# Patient Record
Sex: FEMALE | Race: WHITE | NOT HISPANIC OR LATINO | URBAN - METROPOLITAN AREA
[De-identification: names, ages, dates, MRNs, and addresses within clinical notes are randomized per-mention and may not be internally consistent; named-entity substitution may affect disease eponyms.]

---

## 2021-09-02 ENCOUNTER — COMPLETE EYE EXAM (OUTPATIENT)
Dept: URBAN - METROPOLITAN AREA CLINIC 6 | Facility: CLINIC | Age: 66
End: 2021-09-02

## 2021-09-02 DIAGNOSIS — H25.13: ICD-10-CM

## 2021-09-02 DIAGNOSIS — H52.13: ICD-10-CM

## 2021-09-02 PROCEDURE — 92015 DETERMINE REFRACTIVE STATE: CPT

## 2021-09-02 PROCEDURE — 92014 COMPRE OPH EXAM EST PT 1/>: CPT

## 2021-09-02 ASSESSMENT — VISUAL ACUITY
OD_CC: 20/20
OS_CC: 20/25

## 2021-09-02 ASSESSMENT — TONOMETRY
OD_IOP_MMHG: 12
OS_IOP_MMHG: 14

## 2021-10-11 ENCOUNTER — GLASSES CHECK (OUTPATIENT)
Dept: URBAN - METROPOLITAN AREA CLINIC 6 | Facility: CLINIC | Age: 66
End: 2021-10-11

## 2021-10-11 DIAGNOSIS — H52.203: ICD-10-CM

## 2021-10-11 DIAGNOSIS — H52.13: ICD-10-CM

## 2021-10-11 DIAGNOSIS — H52.4: ICD-10-CM

## 2021-10-11 PROCEDURE — G8428 CUR MEDS NOT DOCUMENT: HCPCS

## 2021-10-11 PROCEDURE — 1036F TOBACCO NON-USER: CPT

## 2021-10-11 PROCEDURE — 92012 INTRM OPH EXAM EST PATIENT: CPT | Mod: NC

## 2021-10-11 ASSESSMENT — VISUAL ACUITY
OD_CC: J1
OD_CC: 20/30-1
OS_CC: J1
OS_CC: 20/30+2

## 2023-06-01 ENCOUNTER — OFFICE VISIT (OUTPATIENT)
Dept: OBGYN CLINIC | Facility: MEDICAL CENTER | Age: 68
End: 2023-06-01

## 2023-06-01 ENCOUNTER — APPOINTMENT (OUTPATIENT)
Dept: RADIOLOGY | Facility: MEDICAL CENTER | Age: 68
End: 2023-06-01
Payer: MEDICARE

## 2023-06-01 VITALS
DIASTOLIC BLOOD PRESSURE: 77 MMHG | HEART RATE: 60 BPM | WEIGHT: 139 LBS | BODY MASS INDEX: 23.16 KG/M2 | HEIGHT: 65 IN | SYSTOLIC BLOOD PRESSURE: 149 MMHG

## 2023-06-01 DIAGNOSIS — D48.0 NEOPLASM OF UNCERTAIN BEHAVIOR OF BONE AND ARTICULAR CARTILAGE: Primary | ICD-10-CM

## 2023-06-01 DIAGNOSIS — S86.891S MEDIAL TIBIAL STRESS SYNDROME, RIGHT, SEQUELA: ICD-10-CM

## 2023-06-01 DIAGNOSIS — D48.0 NEOPLASM OF UNCERTAIN BEHAVIOR OF BONE AND ARTICULAR CARTILAGE: ICD-10-CM

## 2023-06-01 PROCEDURE — 73590 X-RAY EXAM OF LOWER LEG: CPT

## 2023-06-01 RX ORDER — OMEPRAZOLE 20 MG/1
TABLET, DELAYED RELEASE ORAL
COMMUNITY

## 2023-06-01 RX ORDER — PRAVASTATIN SODIUM 20 MG
20 TABLET ORAL DAILY
COMMUNITY
Start: 2023-04-26

## 2023-06-01 RX ORDER — HYDROCHLOROTHIAZIDE 25 MG/1
TABLET ORAL
COMMUNITY
Start: 2023-03-29

## 2023-06-01 RX ORDER — CALCIUM CARBONATE 500 MG/1
TABLET, CHEWABLE ORAL
COMMUNITY

## 2023-06-01 RX ORDER — LORATADINE 10 MG/1
TABLET ORAL
COMMUNITY

## 2023-06-01 RX ORDER — PSEUDOEPHEDRINE HCL 30 MG
250 TABLET ORAL
COMMUNITY

## 2023-06-01 RX ORDER — SERTRALINE HYDROCHLORIDE 25 MG/1
25 TABLET, FILM COATED ORAL DAILY
COMMUNITY
Start: 2023-05-27

## 2023-06-01 NOTE — PROGRESS NOTES
Orthopedic Surgery Office Note  Malcolm Washington (31 y o  female)  : 1955 Encounter Date: 2023  Dr Caleb Loernzana DO, Orthopedic Surgeon  Orthopedic Oncology & [facility] Surgery   Phone:[phone] HSW:[phone]    Assessment and Plan: Malcolm Washington is a 76 y o  female with:     1  Stress reaction of right tibia, 3 weeks sp right bone biopsy  - based on history of stress fractures and MRI prior to bone biopsy shows periosteal inflammation  - marrow edema at the site of the periostitis can be seen as well  - discussed propensity to have stress reaction and stress fractures with this history and current findings  - discussed further imaging would be less helpful at this point due to inflammation from surgery  - current treatment following plan of stress reaction in bone/shin splints; to wear CAM boot and reduce levels of activity, especially high impact, and increase amount of rest    - discussed soft tissue and bony healing at 6 week anna, and still in acute healing period, to expect overlap of pain and surgical discomfort   - cannot take antiinflammatories due to history of ulcer dx on EGD  - osteopenia and relationship with PPI   - Activity: ADLs with CAM boot/supportive sneaker, restrict LE impact activities   - after 6-8 week post op period, return to stress reaction management   - voltaren gel written; instructed to avoid surgical wound      2  Osteoporosis   - discussed continued management with Vit D and calcium  - discussed further management regarding bisphosphonate/prolia with primary care to reduce risk of fracture if indicated at this time    3   Comorbidity, including: GERD, ASHLEY, hypothyroid, osteopenia, HLD  - continue current management   - encouraged follow up with GI to determine when appropriate to return to antiinflammatories     Procedure:  No procedures performed    Surgical Planning:   No surgery planned at this time    Follow up: Return in about 3 months (around "9/1/2023), or if symptoms worsen or fail to improve  Problem List Items Addressed This Visit        Other    Medial tibial stress syndrome, right, sequela   Other Visit Diagnoses     Neoplasm of uncertain behavior of bone and articular cartilage    -  Primary    Relevant Medications    Diclofenac Sodium (VOLTAREN) 1 %    Other Relevant Orders    XR tibia fibula 2 vw right        ___________________________________________________________________    History of Present Illness:     Lorri Corbin is a 76 y o  female  who presents for consultation at the request of Erna Mejía MD  regarding follow-up status post bone biopsy due to abnormal MRI findings so she can be closer managed to  February pain beginning, thought to be stress fracture  DOS: May 8th bone biopsy  Surgical discomfort plus prior pain sticking around  Proximal pain unchanged    She has reduced her activity with improvement in symptoms  Activity reduced due to pain  Pain and symptoms described when standing/walking/moving  Resolves with sitting and elevation  Pain described as sharp occasionally upon waking recently since surgery  Otherwise aching  Activity: likes to take walks, go to the gym, ride bikes, swimming  Wishes to return to tennis  Confirmed with MRI   2018 stress fracture in distal tib/fib; resolved with 6 months   2015 stress fracture in tibia     At baseline patient gaits without assistance  Denies constitutional symptoms such as fever, chills, night sweats, fatigue, weight gains/losses  Denies  chest pain/shortness of breath  Review of Systems:   Allergies, medications, past medical/surgical/family/social history have been reviewed  Complete 12 system review performed and found to be negative except: except as per mentioned in HPI      Physical Examination:   Height: 5' 5\" (165 1 cm)  Weight - Scale: 63 kg (139 lb)  BMI (Calculated): 23 1  BSA (Calculated - m2): 1 69 sq meters  Pain Assessment  Pain " Loc: Leg     Vitals:    06/01/23 0930   BP: 149/77   Pulse: 60     Body mass index is 23 13 kg/m²  General: alert and oriented; well nourished/well developed; no apparent distress  Psychiatric: normal mood and affect  HEENT: NCAT  Head/neck - full range of motion  Lungs: breathing comfortably; equal symmetric chest expansion  Abdomen: soft, non-tender, non-distended  Skin: warm; dry; no lesions, rashes, petechiae or purpura; no clubbing, no cyanosis, no edema, no palpable masses  Extremity: Right shin -    Inspection: postsurgical changes to distal tibia; peeling of skin s/p reaction to bandage   Palpation: no palpable masses or lesions   Range of motion of joints:  WFL  range of motion all extremities  Motor strength: WNL all extremities  Dorsal/Plantar flexion: intact  Sensation: grossly intact to all extremities  Pulses: present  Gait: normal gait  Postsurgical changes and scabbing to medial aspect of distal tibia    IMAGING RESULTS, All images personally review today by Dr Ofelia Hopkins  Study: XR tib/fib right  Date: 06/01/23  Impression: I have personally reviewed all relevant imaging  No radiologist report was available at this time  My impression is as follows:  evidence of surgical changes with cement placement in the distal tibia with cement placement, no evidence of acute osseous abnormality     Study: MRI right LE (prior to biopsy)  Date: 4/14/23  Impression: I have personally reviewed all relevant imaging  No radiologist report was available at this time  My impression is as follows:  Bone marrow edema present in right distal tibia, periosteal reaction noted; no evidence of stress fracture     Pertinent laboratory findings:  N/A    Pathology: FINAL 5/8/23   Bone, tibia, open biopsy:  - Fragments of mature/benign bone with focal areas of reactive lymphoid aggregates and mild fibrosis, negative for overt malignancy, see comment      Microbiology:  Cultures: N/A    Review of referring provider's records:  Referring provider: Berna Blancas MD    5/23/23; Danny Bacon PA-C with Dr Taya Oneal   She is s/p biopsy of her right tibia on 5/8/23  She is feeling not so great today    Pathology showed no sign of malignancy  Patient still has some swelling and discomfort around her ankle which is to be expected after having the open biopsy of the distal tibia  At this point, she would rather follow-up close to her home  She is going to follow up with her primary care physician near [place] [id number]      4/25/23; Danny Bacon PA-C with Dr Haja Jenkins taken showed no dramatic changes or bony irregularity  However a new MRI scan was taken that shows significant edema in uptake and what appears to be some bone marrow changes that could be causing her current pain and discomfort  Newbern Mourning Newbern Mourning Since the patient has not gotten any better over this past 2 months concern is for possible enchondroma that has now causing increasing pain or discomfort, possible bone marrow packing, or possible bone lesion  MRI Tibia Fibula w wo contrast Right  Result Date: 4/17/2023  Narrative: INDICATION: Abnormal finding  TECHNIQUE: MRI of right lower leg was performed with and without intravenous contrast  COMPARISON: Right tibia and fibula x-rays dated 3/23/2023  FINDINGS: Bones: 1 7 x 1 5 x 5 cm heterogeneous T1 and T2 intermediate intensity lesion within the distal tibia without aggressive periosteal reaction, cortical breakthrough or adjacent soft tissue  There is mildly curvilinear hypointense T1 signal  No acute fracture or osteonecrosis  Soft tissues: Muscle bulk is maintained  Anterior extensor, medial flexor and peroneal tendons are intact  Achilles tendon is intact and measures 5 5 mm  The interosseous syndesmotic ligament is intact  Scar thickening of the anterior and posterior inferior tibiofibular, anterior and posterior talofibular ligaments without full-thickness tears    Impression: IMPRESSION: Approximately 5 x 1 7 x 1 5 cm within distal tibia without aggressive features, most likely an area of avascular necrosis  Differential considerations include nonaggressive fibro-osseous lesion or possibly an evolving intraosseous lipoma  Malignancy such as metastases or involvement from leukemia/lymphoma or multiple myeloma are felt to be less likely  Orthopedic oncology consultation can be considered and close clinical and imaging follow-up suggested   Workstation:[id number]          Patient Care team:   Patient Care Team:  Brianne Mclena MD as PCP - General (Geriatric Medicine)     Past Medical History:   Diagnosis Date   • GERD (gastroesophageal reflux disease)    • Hyperlipidemia    • Hypertension    • Hypothyroid    • Migraine    • ASHLEY on CPAP    • Osteoarthritis      Past Surgical History:   Procedure Laterality Date   • COLONOSCOPY     • EGD     • HERNIA REPAIR     • MOUTH SURGERY      crown placement   • TOTAL ABDOMINAL HYSTERECTOMY W/ BILATERAL SALPINGOOPHORECTOMY         Current Outpatient Medications:   •  ALPRAZolam (Xanax) 0 25 mg tablet, , Disp: , Rfl:   •  Biotin 2 5 MG CAPS, Take 1 tablet by mouth daily, Disp: , Rfl:   •  calcium carbonate (Tums) 500 mg chewable tablet, Chew, Disp: , Rfl:   •  cholecalciferol (VITAMIN D3) 1,000 units tablet, Take 1,000 Units by mouth daily, Disp: , Rfl:   •  Diclofenac Sodium (VOLTAREN) 1 %, Apply 2 g topically 4 (four) times a day, Disp: 150 g, Rfl: 1  •  Docusate Sodium (DSS) 250 MG CAPS, Take 250 mg by mouth, Disp: , Rfl:   •  fluticasone (Flonase) 50 mcg/act nasal spray, , Disp: , Rfl:   •  hydrochlorothiazide (HYDRODIURIL) 25 mg tablet, 1 tablet 3 times per week or as directed, Disp: , Rfl:   •  Levothyroxine Sodium 25 MCG CAPS, Take by mouth, Disp: , Rfl:   •  loratadine (Claritin) 10 mg tablet, Take by mouth, Disp: , Rfl:   •  losartan (COZAAR) 100 MG tablet, Take by mouth, Disp: , Rfl:   •  Multiple Vitamins-Minerals (multivitamin with minerals) tablet, Take by mouth daily, Disp: , Rfl:   •  omeprazole (PriLOSEC OTC) 20 MG tablet, , Disp: , Rfl:   •  omeprazole (PriLOSEC) 20 mg delayed release capsule, Take 20 mg by mouth daily, Disp: , Rfl:   •  pravastatin (PRAVACHOL) 10 mg tablet, Take 10 mg by mouth every evening, Disp: , Rfl:   •  pravastatin (PRAVACHOL) 20 mg tablet, Take 20 mg by mouth daily, Disp: , Rfl:   •  sertraline (ZOLOFT) 25 mg tablet, Take 25 mg by mouth daily, Disp: , Rfl:   •  azelastine (ASTELIN) 0 1 % nasal spray, 1 spray into each nostril 2 (two) times a day (Patient not taking: Reported on 6/1/2023), Disp: 30 mL, Rfl: 11  •  sucralfate (CARAFATE) 1 g tablet, Take 1 g by mouth 4 (four) times a day (Patient not taking: Reported on 6/1/2023), Disp: , Rfl:   Allergies   Allergen Reactions   • Penicillins Other (See Comments) and Rash     rash     • Cortisone Other (See Comments)     hot, red face     • Simvastatin Myalgia   • Escitalopram Anxiety, Other (See Comments) and Palpitations     Family History   Problem Relation Age of Onset   • Hypertension Mother    • Hyperlipidemia Mother    • Lymphoma Mother    • Diabetes Father    • Cancer Father    • Heart failure Father      Social History     Socioeconomic History   • Marital status: Unknown     Spouse name: Not on file   • Number of children: Not on file   • Years of education: Not on file   • Highest education level: Not on file   Occupational History   • Not on file   Tobacco Use   • Smoking status: Never   • Smokeless tobacco: Never   Vaping Use   • Vaping Use: Never used   Substance and Sexual Activity   • Alcohol use:  Yes   • Drug use: Never   • Sexual activity: Not on file   Other Topics Concern   • Not on file   Social History Narrative    Consumes 1 cup of coffee per day     Social Determinants of Health     Financial Resource Strain: Not on file   Food Insecurity: Not on file   Transportation Needs: Not on file   Physical Activity: Not on file   Stress: Not on file   Social Connections: Not on file   Intimate Partner Violence: Not on file   Housing Stability: Not on file       45 minutes was spent in the coordination of care, reviewing of imaging and with the patient on the date of service    Scribe Attestation    I,:  Diego Piage PA-C am acting as a scribe while in the presence of the attending physician :       I,:  Quyen Alfaro DO personally performed the services described in this documentation    as scribed in my presence :            Diego Piage PA-C   6/1/2023 10:41 AM

## 2023-06-29 DIAGNOSIS — D48.0 NEOPLASM OF UNCERTAIN BEHAVIOR OF BONE AND ARTICULAR CARTILAGE: Primary | ICD-10-CM

## 2023-06-29 DIAGNOSIS — S86.891S MEDIAL TIBIAL STRESS SYNDROME, RIGHT, SEQUELA: ICD-10-CM

## 2023-07-13 ENCOUNTER — EVALUATION (OUTPATIENT)
Dept: PHYSICAL THERAPY | Facility: MEDICAL CENTER | Age: 68
End: 2023-07-13
Payer: MEDICARE

## 2023-07-13 DIAGNOSIS — D48.0 NEOPLASM OF UNCERTAIN BEHAVIOR OF BONE AND ARTICULAR CARTILAGE: ICD-10-CM

## 2023-07-13 DIAGNOSIS — S86.891S MEDIAL TIBIAL STRESS SYNDROME, RIGHT, SEQUELA: Primary | ICD-10-CM

## 2023-07-13 PROCEDURE — 97161 PT EVAL LOW COMPLEX 20 MIN: CPT | Performed by: PHYSICAL THERAPIST

## 2023-07-13 PROCEDURE — 97110 THERAPEUTIC EXERCISES: CPT | Performed by: PHYSICAL THERAPIST

## 2023-07-13 NOTE — PROGRESS NOTES
PT Evaluation     Today's date: 2023  Patient name: Álvaro Maldonado  : 1955  MRN: [id number]  Referring provider: Gail Denton PA-C  Dx:   Encounter Diagnosis     ICD-10-CM    1. Neoplasm of uncertain behavior of bone and articular cartilage  D48.0 Ambulatory Referral to Physical Therapy      2. Medial tibial stress syndrome, right, sequela  S86.891S Ambulatory Referral to Physical Therapy                     Assessment  Assessment details: Álvaro Maldonado is a pleasant 76 y.o. female who presents with R anterior shin pain since 2023. She received an MRI in 2023 that showed a potential lesion. She underwent a bone biopsy on 23 that was negative for malignancy. No further referral is necessary at this time based upon examination results. Primary movement impairment is R ankle hypomobility as expected 9+ weeks s/p R tibial biopsy (DOS: 23), which limits her ability to stand for longer periods and contributes to pain when driving. Mild R anterior ankle edema also contributes to ROM limitations and further reduces her functional activity tolerance. In addition, multiplanar strength deficits in her R ankle and concurrent R LE balance dysfunction prevent her from ambulating long distances and participating in tennis. Patient was educated in an illustrated HEP for ankle mobility and was able to complete exercises without pain. Patient would benefit from skilled PT services to address the listed impairments to facilitate a return to PLOF.  Thank you for the referral.    Impairments: abnormal gait, abnormal muscle firing, abnormal muscle tone, abnormal or restricted ROM, abnormal movement, activity intolerance, impaired balance, impaired physical strength, lacks appropriate home exercise program and pain with function  Functional limitations: walking, standing, tennisBarriers to therapy: none  Understanding of Dx/Px/POC: good   Prognosis: good  Prognosis details: Positive prognostic factors include positive attitude towards recovery. Negative prognostic factors include chronicity of symptoms. Goals  STG:  Patient will be independent with home exercise program.   Patient will demonstrate decreased swelling in R anterior ankle to improve mobility for ambulation. LTG:  Patient will increase R ankle DF AROM to at least 5-10 deg to improve quality of gait mechanics. Patient will increase R ankle strength to at least 4+/5 in all planes to be able to stand for longer periods. Patient will be able to ambulate longer distances in the community. Patient will be able to participate in tennis with modifications as necessary. Patient will be able to manage symptoms independently. Plan  Plan details: Prognosis is above given PT services 2x/week tapering to 1x/week over the next 8 weeks and given HEP adherence. Patient would benefit from: skilled physical therapy  Referral necessary: No  Planned modality interventions: cryotherapy and thermotherapy: hydrocollator packs  Planned therapy interventions: activity modification, balance, balance/weight bearing training, body mechanics training, flexibility, functional ROM exercises, gait training, graded activity, graded exercise, home exercise program, joint mobilization, kinesiology taping, manual therapy, massage, Wheatley taping, neuromuscular re-education, patient education, strengthening, stretching, therapeutic activities and therapeutic exercise  Frequency: 2x week  Duration in weeks: 8  Treatment plan discussed with: patient        Subjective Evaluation    History of Present Illness  Date of surgery: 5/8/2023  Mechanism of injury: surgery  Mechanism of injury: This is a 76 y.o. female presenting with R shin pain since February 2023. The pain began after increasing her intensity of biking and worsened after walking longer distances.  The pain improved with rest. She received an MRI in April 2023 that showed periosteal inflammation, bone marrow edema, and potential lesion per chart. She underwent an open biopsy of her R tibia on 23. Pathology evaluated the biopsy, and the lesion was not malignant. Since the biopsy, her pain is slightly improved in her shin. However, she continues to have some aching in her ankle and shin, especially when walking. She does note that she has improved her ability to go up and down the stairs reciprocally. She is very active and desires to return to long distance walking, tennis, and swimming. She has a history of osteoporosis, a stress fracture in her tibia in 2015, and a stress fracture in her distal tib/fib from 2018. Quality of life: good    Patient Goals  Patient goals for therapy: decreased pain, increased strength, return to sport/leisure activities and improved balance  Patient goal: to be able to walk, play tennis, swim  Pain  Current pain ratin  At best pain ratin  At worst pain rating: 3  Location: R tibial shaft, R anterior ankle  Quality: dull ache  Relieving factors: rest (voltaren)  Aggravating factors: standing and walking (driving)      Diagnostic Tests  X-ray: normal (23- R distal tib/fib- no evidence of stress fracture)  MRI studies: abnormal (2023- potential bone lesion (prior to biopsy))  Treatments  No previous or current treatments        Objective     Observations     Right Ankle/Foot   Positive for edema (mild- R anterior distal tibia) and incision. Additional Observation Details  Incision on R anterior distal tibia was visualized and is well healed; no excessive erythema, no warmth    Palpation     Right   Tenderness of the anterior tibialis.      Tenderness     Additional Tenderness Details  (+) TTP R anteromedial shaft    Active Range of Motion   Left Ankle/Foot   Dorsiflexion (ke): 3 degrees   Plantar flexion: 45 degrees   Inversion: 35 degrees   Eversion: 15 degrees     Right Ankle/Foot   Dorsiflexion (ke): 0 degrees with pain  Plantar flexion: 40 degrees Inversion: 30 degrees   Eversion: 5 degrees     Passive Range of Motion   Left Ankle/Foot    Dorsiflexion (ke): 5 degrees   Dorsiflexion (kf): 10 degrees   Plantar flexion: 50 degrees   Inversion: 35 degrees   Eversion: 15 degrees     Right Ankle/Foot    Dorsiflexion (ke): 3 degrees   Dorsiflexion (kf): 8 degrees    Plantar flexion: 45 degrees   Inversion: 30 degrees   Eversion: 10 degrees     Joint Play   Left Ankle/Foot  Joints within functional limits are the talocrural joint. Right Ankle/Foot  Hypomobile in the talocrural joint. Strength/Myotome Testing     Left Hip   Planes of Motion   Abduction: 4-    Right Hip   Planes of Motion   Abduction: 3+    Left Ankle/Foot   Dorsiflexion: 5  Plantar flexion: 5  Inversion: 5  Eversion: 5    Right Ankle/Foot   Dorsiflexion: 3+  Plantar flexion: 3+  Inversion: 3+  Eversion: 3+    Additional Strength Details  Able to complete 20 DL heel raises with good symmetry; fatigue in R gastroc/soleus    Able to complete 10 DL toe raises with decreased CKC ankle DF AROM noted on R LE along with fatigue in R anterior tibia    Functional Assessment      Squat    Left tibial anterior translation beyond toes and right tibial anterior translation beyond toes.      Single Leg Stance   Left: 10 seconds  Right: 5 (increased ankle sway) seconds             Precautions: s/p R tibial bone biopsy (5/8/23), osteoporosis, hx of R tibial stress fracture 2015, hx of R distal tib/fib stress fracture 2018    *Potential adhesive allergy      HEP: strap gastroc stretch, ankle AROM (DF/PF/INV/EV  Manuals 7/13            R ankle PROM NV                                                   Neuro Re-Ed             Romberg             Tandem             SLS                                                                 Ther Ex             Rec bike             Strap gastroc stretch 30"x3 HEP            Ankle AROM x30 ea 4-way HEP            Ankle tband 4-way NV            Heel raises NV seated Toe raises NV seated            BAPS board NV            Wall gastroc stretch             HEP education and instruction x10'            Ther Activity                                       Gait Training                                       Modalities

## 2023-07-25 ENCOUNTER — OFFICE VISIT (OUTPATIENT)
Dept: PHYSICAL THERAPY | Facility: MEDICAL CENTER | Age: 68
End: 2023-07-25
Payer: MEDICARE

## 2023-07-25 DIAGNOSIS — S86.891S MEDIAL TIBIAL STRESS SYNDROME, RIGHT, SEQUELA: Primary | ICD-10-CM

## 2023-07-25 DIAGNOSIS — D48.0 NEOPLASM OF UNCERTAIN BEHAVIOR OF BONE AND ARTICULAR CARTILAGE: ICD-10-CM

## 2023-07-25 PROCEDURE — 97140 MANUAL THERAPY 1/> REGIONS: CPT | Performed by: PHYSICAL THERAPIST

## 2023-07-25 PROCEDURE — 97110 THERAPEUTIC EXERCISES: CPT | Performed by: PHYSICAL THERAPIST

## 2023-07-25 NOTE — PROGRESS NOTES
Daily Note     Today's date: 2023  Patient name: Charmayne Levy  : 1955  MRN: [id number]  Referring provider: Dwan Favre, PA-C  Dx:   Encounter Diagnosis     ICD-10-CM    1. Medial tibial stress syndrome, right, sequela  S86.891S       2. Neoplasm of uncertain behavior of bone and articular cartilage  D48.0                      Subjective: Patient reports that she continues to have discomfort in her shin when walking. She recently traveled to the beach, and she had a lot of difficulty walking to the beach from her house. She notes some burning in her shin when doing her home exercise program but no sharp pain. Objective: See treatment diary below      Assessment: Performed gentle R ankle PROM to address end-range ankle mobility limitations with patient demonstrating improvements in PROM in all planes post-tx. Able to initiate recumbent biking today, which demonstrates good progress toward goals. Initiated gentle ankle mobility/stability interventions as outlined below. Patient reported mild discomfort/muscular fatigue in R anterior tibialis during resisted DF with tband but was able to complete exercise without pain. She also demonstrated appropriate fatigue in R ankle DFs during seated toe raises. Patient tolerated treatment well and completed program without pain. Patient would benefit from continued PT to address impairments to maximize function. Plan: Continue per plan of care.       Precautions: s/p R tibial bone biopsy (23), osteoporosis, hx of R tibial stress fracture , hx of R distal tib/fib stress fracture 2018    *Potential adhesive allergy      HEP: strap gastroc stretch, ankle AROM (DF/PF/INV/EV  Manuals            R ankle PROM NV KP                                                  Neuro Re-Ed             Romberg             Tandem             SLS                                                                 Ther Ex             Rec bike  5'           Strap gastroc stretch 30"x3 HEP 30"x3           Ankle AROM x30 ea 4-way HEP HEP           Ankle tband 4-way NV x10 ea YTB           Heel raises NV seated 5"x20 seated           Toe raises NV seated 5"x10 seated           BAPS board NV 5"x20 ea DF/PF L1           Wall gastroc stretch             HEP education and instruction x10'            Ther Activity                                       Gait Training                                       Modalities

## 2023-07-27 ENCOUNTER — OFFICE VISIT (OUTPATIENT)
Dept: PHYSICAL THERAPY | Facility: MEDICAL CENTER | Age: 68
End: 2023-07-27
Payer: MEDICARE

## 2023-07-27 DIAGNOSIS — S86.891S MEDIAL TIBIAL STRESS SYNDROME, RIGHT, SEQUELA: Primary | ICD-10-CM

## 2023-07-27 DIAGNOSIS — D48.0 NEOPLASM OF UNCERTAIN BEHAVIOR OF BONE AND ARTICULAR CARTILAGE: ICD-10-CM

## 2023-07-27 PROCEDURE — 97110 THERAPEUTIC EXERCISES: CPT | Performed by: PHYSICAL THERAPIST

## 2023-07-27 PROCEDURE — 97140 MANUAL THERAPY 1/> REGIONS: CPT | Performed by: PHYSICAL THERAPIST

## 2023-07-27 NOTE — PROGRESS NOTES
Daily Note     Today's date: 2023  Patient name: Gladis Mesa  : 1955  MRN: [id number]  Referring provider: Igor Bruner PA-C  Dx:   Encounter Diagnosis     ICD-10-CM    1. Medial tibial stress syndrome, right, sequela  S86.891S       2. 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 She notes that the pain returns when she is walking, and she has been having some discomfort if she dorsiflexes her foot when doing the inversion range of motion exercises. Objective: See treatment diary below      Assessment: Continued with gentle R ankle PROM to address ankle hypomobliity with patient demonstrating improvements in PROM in all planes post-tx. Continued with ankle mobility and strengthening interventions as outlined below. She reported muscular fatigue in R anterior tibialis musculature after resisted DF. Patient was educated to modify angle of INV AROM exercise during HEP to avoid pain. Held addition of 4-way tband to HEP due to fatigue after this intervention post-tx. Able to progress reps for seated toe raises, which demonstrates good progress toward goals. Patient demonstrated fatigue post-tx but was able to complete program without pain. She would benefit from continued PT to improve ankle mobility and strength to return to active lifestyle. Plan: Continue per plan of care.       Precautions: s/p R tibial bone biopsy (23), osteoporosis, hx of R tibial stress fracture , hx of R distal tib/fib stress fracture     *Potential adhesive allergy      HEP: strap gastroc stretch, ankle AROM (DF/PF/INV/EV  Manuals           R ankle PROM NV KP                                                  Neuro Re-Ed             Romberg             Tandem             SLS Ther Ex             Rec bike  5' 5'          Strap gastroc stretch 30"x3 HEP 30"x3 30"x4          Ankle AROM x30 ea 4-way HEP HEP HEP          Ankle tband 4-way NV x10 ea YTB x10 ea YTB          Heel raises NV seated 5"x20 seated 5"x20 seated          Toe raises NV seated 5"x10 seated 5"x15 seated          BAPS board NV 5"x20 ea DF/PF L1 5"x20 ea DF/PF L1          Wall gastroc stretch             HEP education and instruction x10'            Ther Activity                                       Gait Training                                       Modalities

## 2023-07-31 ENCOUNTER — OFFICE VISIT (OUTPATIENT)
Dept: PHYSICAL THERAPY | Facility: MEDICAL CENTER | Age: 68
End: 2023-07-31
Payer: MEDICARE

## 2023-07-31 DIAGNOSIS — D48.0 NEOPLASM OF UNCERTAIN BEHAVIOR OF BONE AND ARTICULAR CARTILAGE: ICD-10-CM

## 2023-07-31 DIAGNOSIS — S86.891S MEDIAL TIBIAL STRESS SYNDROME, RIGHT, SEQUELA: Primary | ICD-10-CM

## 2023-07-31 PROCEDURE — 97140 MANUAL THERAPY 1/> REGIONS: CPT | Performed by: PHYSICAL THERAPIST

## 2023-07-31 PROCEDURE — 97110 THERAPEUTIC EXERCISES: CPT | Performed by: PHYSICAL THERAPIST

## 2023-07-31 NOTE — PROGRESS NOTES
Daily Note     Today's date: 2023  Patient name: Angelica Briceno  : 1955  MRN: [id number]  Referring provider: Swapna Nahs PA-C  Dx:   Encounter Diagnosis     ICD-10-CM    1. Medial tibial stress syndrome, right, sequela  S86.891S       2. 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 The soreness is located in her R shin and also radiates up into her R hip. Objective: See treatment diary below      Assessment: Patient continues to respond well to manual interventions with improved end-range R ankle DF PROM post-tx. Able to progress reps for 4-way ankle tband strengthening, which demonstrates an improvement in multiplanar ankle endurance. She also was able to progress height for BAPS board today, which further demonstrates good progress toward goals. Added SLS to improve R LE strength. Also added 4-way ankle strengthening with tband to HEP to improve ability to ambulate. Patient demonstrated appropriate fatigue post-tx and completed program without pain. Patient would benefit from continued PT to improve ankle mobility and strength to maximize function. Plan: Continue per plan of care.       Precautions: s/p R tibial bone biopsy (23), osteoporosis, hx of R tibial stress fracture , hx of R distal tib/fib stress fracture 2018    *Potential adhesive allergy      HEP: strap gastroc stretch, ankle AROM (DF/PF/INV/EV)  Manuals          R ankle PROM NV KP KP KP                                                Neuro Re-Ed                                                                 Ther Ex             Rec bike  5' 5' 5'         Strap gastroc stretch 30"x3 HEP 30"x3 30"x4 30"x4         Ankle AROM x30 ea 4-way HEP HEP HEP HEP         Ankle tband 4-way NV x10 ea YTB x10 ea YTB 2x10 ea YTB         Heel raises NV seated 5"x20 seated 5"x20 seated 5"x20 seated         Toe raises NV seated 5"x10 seated 5"x15 seated 5"x20 seated         BAPS board NV 5"x20 ea DF/PF L1 5"x20 ea DF/PF L1 5"x20 ea 4-way L2         SLS    10"x10 R         Wall gastroc stretch             HEP education and instruction x10'   x5'         Ther Activity                                       Gait Training                                       Modalities

## 2023-08-01 ENCOUNTER — APPOINTMENT (OUTPATIENT)
Dept: PHYSICAL THERAPY | Facility: MEDICAL CENTER | Age: 68
End: 2023-08-01
Payer: MEDICARE

## 2023-08-03 ENCOUNTER — OFFICE VISIT (OUTPATIENT)
Dept: PHYSICAL THERAPY | Facility: MEDICAL CENTER | Age: 68
End: 2023-08-03
Payer: MEDICARE

## 2023-08-03 DIAGNOSIS — S86.891S MEDIAL TIBIAL STRESS SYNDROME, RIGHT, SEQUELA: Primary | ICD-10-CM

## 2023-08-03 DIAGNOSIS — D48.0 NEOPLASM OF UNCERTAIN BEHAVIOR OF BONE AND ARTICULAR CARTILAGE: ICD-10-CM

## 2023-08-03 PROCEDURE — 97110 THERAPEUTIC EXERCISES: CPT | Performed by: PHYSICAL THERAPIST

## 2023-08-03 PROCEDURE — 97140 MANUAL THERAPY 1/> REGIONS: CPT | Performed by: PHYSICAL THERAPIST

## 2023-08-03 NOTE — PROGRESS NOTES
Daily Note     Today's date: 8/3/2023  Patient name: Tiff Quijano  : 1955  MRN: [id number]  Referring provider: Violet Castellano PA-C  Dx:   Encounter Diagnosis     ICD-10-CM    1. Medial tibial stress syndrome, right, sequela  S86.891S       2. 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 However, she notes that she has been able to do more frequent 10 minute walks at home. She has been having some soreness in her hips since she has been focusing on not limping when walking. Objective: See treatment diary below      Assessment: Continued with manual interventions to improve ankle hypomobility with patient demonstrating improved ankle DF PROM post-tx. Able to progress resistance for 4-way ankle strengthening with tband, which demonstrates good progress toward goals. Patient was given tband of increased resistance for HEP performance, and she was educated to progress tband HEP as tolerated. Also added standing heel/toe raises today, which demonstrates an improvement in CKC strength. Held SLS today due to fatigue after toe raises. Patient demonstrated appropriate fatigue post-tx and tolerated treatment well. Patient would benefit from continued PT to further improve ankle mobility and strength to be able to ambulate longer distances. Plan: Continue per plan of care.       Precautions: s/p R tibial bone biopsy (23), osteoporosis, hx of R tibial stress fracture , hx of R distal tib/fib stress fracture     *Potential adhesive allergy      HEP: strap gastroc stretch, ankle AROM (DF/PF/INV/EV)  Manuals 7/13 7/25 7/27 7/31 8/3        R ankle PROM NV KP KP [facility]                                               Neuro Re-Ed                                                                 Ther Ex             Rec bike  5' 5' 5' 5' Strap gastroc stretch 30"x3 HEP 30"x3 30"x4 30"x4 30"x4        Ankle AROM x30 ea 4-way HEP HEP HEP HEP         Ankle tband 4-way NV x10 ea YTB x10 ea YTB 2x10 ea YTB x20 ea RTB        Heel raises NV seated 5"x20 seated 5"x20 seated 5"x20 seated 2x10 stand        Toe raises NV seated 5"x10 seated 5"x15 seated 5"x20 seated x10 stand        BAPS board NV 5"x20 ea DF/PF L1 5"x20 ea DF/PF L1 5"x20 ea 4-way L2 5"x20 ea 4-way L2        SLS    10"x10 R NV        Wall gastroc stretch             HEP education and instruction x10'   x5'         Ther Activity                                       Gait Training                                       Modalities

## 2023-08-08 ENCOUNTER — OFFICE VISIT (OUTPATIENT)
Dept: PHYSICAL THERAPY | Facility: MEDICAL CENTER | Age: 68
End: 2023-08-08
Payer: MEDICARE

## 2023-08-08 DIAGNOSIS — S86.891S MEDIAL TIBIAL STRESS SYNDROME, RIGHT, SEQUELA: Primary | ICD-10-CM

## 2023-08-08 DIAGNOSIS — D48.0 NEOPLASM OF UNCERTAIN BEHAVIOR OF BONE AND ARTICULAR CARTILAGE: ICD-10-CM

## 2023-08-08 PROCEDURE — 97110 THERAPEUTIC EXERCISES: CPT | Performed by: PHYSICAL THERAPIST

## 2023-08-08 PROCEDURE — 97140 MANUAL THERAPY 1/> REGIONS: CPT | Performed by: PHYSICAL THERAPIST

## 2023-08-08 NOTE — PROGRESS NOTES
PT Re-Evaluation     Today's date: 2023  Patient name: Sari Abebe  : 1955  MRN: [id number]  Referring provider: Dionne Peace PA-C  Dx:   Encounter Diagnosis     ICD-10-CM    1. Medial tibial stress syndrome, right, sequela  S86.891S       2. 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 Overall, she feels that she is improving and is practicing walking without a limp. Objective: See treatment diary below    R ankle AROM and PROM: WFL and nearly comparable to the contralateral side in all planes except for very mild limitation in end-range R ankle DF AROM/PROM    R ankle strength: 3+/5 all planes    Assessment: Patient has demonstrated good progress with reducing the edema in her R distal tibia since her initial visit, which has improved her R ankle ROM and thus facilitated an improvement in her quality of gait mechanics when ambulating shorter distances. Although improved since IE, patient presents with mild limitations in end-range along with strength deficits in her R ankle compared to the contralateral side. This prevents her from standing for long durations and ambulating long community distances to her prior capacity. Patient responded well to manual interventions today with improved end-range DF PROM post-tx. Also added manual concentric strengthening to improve multiplanar ankle stability. Patient is demonstrating steady progress toward her goals and tolerated all progressions well today. She would benefit from continued PT to improve end-range mobility and progress strengthening to facilitate a full return to active lifestyle.      Goals  STG:  Patient will be independent with home exercise program.- met   Patient will demonstrate decreased swelling in R anterior ankle to improve mobility for ambulation.- met  LTG:  Patient will increase R ankle DF AROM to at least 5-10 deg to improve quality of gait mechanics. - in progress (improved)  Patient will increase R ankle strength to at least 4+/5 in all planes to be able to stand for longer periods. - in progress (improved)  Patient will be able to ambulate longer distances in the community.- in progress  Patient will be able to participate in tennis with modifications as necessary.- in progress  Patient will be able to manage symptoms independently. - in progress     Plan: Continue per POC. 2x/week for 4 more weeks.      Precautions: s/p R tibial bone biopsy (5/8/23), osteoporosis, hx of R tibial stress fracture 2015, hx of R distal tib/fib stress fracture 2018    *Potential adhesive allergy      HEP: strap gastroc stretch, ankle AROM (DF/PF/INV/EV)  Manuals 7/13 7/25 7/27 7/31 8/3 8/8       R ankle PROM NV KP KP KP KP KP       R ankle concentrics      KP 4-way                                 Neuro Re-Ed                                                                 Ther Ex             Rec bike  5' 5' 5' 5' 5'       Strap gastroc stretch 30"x3 HEP 30"x3 30"x4 30"x4 30"x4 30"x4       Ankle AROM x30 ea 4-way HEP HEP HEP HEP         Ankle tband 4-way NV x10 ea YTB x10 ea YTB 2x10 ea YTB x20 ea RTB 2x10 ea GTB       Heel raises NV seated 5"x20 seated 5"x20 seated 5"x20 seated 2x10 stand 2x10 stand       Toe raises NV seated 5"x10 seated 5"x15 seated 5"x20 seated x10 stand 2x10 stand       BAPS board NV 5"x20 ea DF/PF L1 5"x20 ea DF/PF L1 5"x20 ea 4-way L2 5"x20 ea 4-way L2 x20 ea 4-way L3       SLS    10"x10 R NV x10 to fatigue       Wall gastroc stretch             HEP education and instruction x10'   x5'         Ther Activity                                       Gait Training                                       Modalities

## 2023-08-10 ENCOUNTER — OFFICE VISIT (OUTPATIENT)
Dept: PHYSICAL THERAPY | Facility: MEDICAL CENTER | Age: 68
End: 2023-08-10
Payer: MEDICARE

## 2023-08-10 DIAGNOSIS — S86.891S MEDIAL TIBIAL STRESS SYNDROME, RIGHT, SEQUELA: Primary | ICD-10-CM

## 2023-08-10 DIAGNOSIS — D48.0 NEOPLASM OF UNCERTAIN BEHAVIOR OF BONE AND ARTICULAR CARTILAGE: ICD-10-CM

## 2023-08-10 PROCEDURE — 97110 THERAPEUTIC EXERCISES: CPT | Performed by: PHYSICAL THERAPIST

## 2023-08-10 PROCEDURE — 97140 MANUAL THERAPY 1/> REGIONS: CPT | Performed by: PHYSICAL THERAPIST

## 2023-08-10 NOTE — PROGRESS NOTES
Daily Note     Today's date: 8/10/2023  Patient name: Abimael Jordan  : 1955  MRN: [id number]  Referring provider: Sergio Vidal PA-C  Dx:   Encounter Diagnosis     ICD-10-CM    1. Medial tibial stress syndrome, right, sequela  S86.891S       2. 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 Overall, she feels that she is improving since beginning PT. Objective: See treatment diary below      Assessment: R ankle AROM and PROM WFL today. Continued with manual resisted concentric strengthening to improve multiplanar ankle stability. Less compensation from tibia noted during resisted INV/EV today, which demonstrates good progress toward goals. She was also able to increase reps for 4-way ankle tband strengthening, which demonstrates an improvement in multiplanar ankle endurance. Patient reported fatigue in R LE after SLS and toe raises but was able to complete program without pain. Patient tolerated treatment well and would benefit from continued PT to further improve ankle strength to be able to ambulate longer distances. Plan: Continue per plan of care.       Precautions: s/p R tibial bone biopsy (23), osteoporosis, hx of R tibial stress fracture , hx of R distal tib/fib stress fracture     *Potential adhesive allergy      HEP: strap gastroc stretch, ankle AROM (DF/PF/INV/EV)  Manuals 7/13 7/25 7/27 7/31 8/3 8/8 8/10      R ankle PROM NV KP KP KP KP KP       R ankle concentrics (AROM with manual resistance)      KP 4-way KP 4-way                                Neuro Re-Ed                                                                 Ther Ex             Rec bike  5' 5' 5' 5' 5' 5'      Strap gastroc stretch 30"x3 HEP 30"x3 30"x4 30"x4 30"x4 30"x4 30"x4      Ankle AROM x30 ea 4-way HEP HEP HEP HEP         Ankle tband 4-way NV x10 ea YTB x10 ea YTB 2x10 ea YTB x20 ea RTB 2x10 ea GTB x30 ea GTB      Heel raises NV seated 5"x20 seated 5"x20 seated 5"x20 seated 2x10 stand 2x10 stand x30 stand      Toe raises NV seated 5"x10 seated 5"x15 seated 5"x20 seated x10 stand 2x10 stand x20 stand      BAPS board NV 5"x20 ea DF/PF L1 5"x20 ea DF/PF L1 5"x20 ea 4-way L2 5"x20 ea 4-way L2 x20 ea 4-way L3 x20 ea 4-way L3      SLS    10"x10 R NV x10 to fatigue x10 to fatigue      HEP education and instruction x10'   x5'         Ther Activity                                       Gait Training                                       Modalities

## 2023-08-15 ENCOUNTER — OFFICE VISIT (OUTPATIENT)
Dept: PHYSICAL THERAPY | Facility: MEDICAL CENTER | Age: 68
End: 2023-08-15
Payer: MEDICARE

## 2023-08-15 DIAGNOSIS — S86.891S MEDIAL TIBIAL STRESS SYNDROME, RIGHT, SEQUELA: Primary | ICD-10-CM

## 2023-08-15 PROCEDURE — 97110 THERAPEUTIC EXERCISES: CPT

## 2023-08-15 PROCEDURE — 97140 MANUAL THERAPY 1/> REGIONS: CPT

## 2023-08-15 NOTE — PROGRESS NOTES
Daily Note     Today's date: 8/15/2023  Patient name: Gladis Mesa  : 1955  MRN: [id number]  Referring provider: Igor Bruner PA-C  Dx:   Encounter Diagnosis     ICD-10-CM    1. Medial tibial stress syndrome, right, sequela  S86.891S           Start Time: 1000  Stop Time: 1030  Total time in clinic (min): 30 minutes    Subjective: Pt states she started feeling pain to the shin and decided to take a break from doing the exercises at home. Objective: See treatment diary below      Assessment: Tolerated treatment well. Toe curls with towel improved pain when knee in slightly extended position. Pt noted ant tib stretch to improve symptoms. Pt unable to complete SLS on airex but was able to complete on floor. Patient would benefit from continued PT      Plan: Progress treatment as tolerated.        Precautions: s/p R tibial bone biopsy (23), osteoporosis, hx of R tibial stress fracture , hx of R distal tib/fib stress fracture     *Potential adhesive allergy      HEP: strap gastroc stretch, ankle AROM (DF/PF/INV/EV)  Manuals 7/13 7/25 7/27 7/31 8/3 8/8 8/10 8/15     R ankle PROM NV KP KP KP KP KP       R ankle concentrics (AROM with manual resistance)      KP 4-way KP 4-way CB 4-way in diagnols                               Neuro Re-Ed             Toe Curls        x20     Medial/lateral ankle pulls        x20                               Ther Ex             Rec bike  5' 5' 5' 5' 5' 5' 5'     Strap gastroc stretch 30"x3 HEP 30"x3 30"x4 30"x4 30"x4 30"x4 30"x4 30"x3     Ankle AROM x30 ea 4-way HEP HEP HEP HEP         Ankle tband 4-way NV x10 ea YTB x10 ea YTB 2x10 ea YTB x20 ea RTB 2x10 ea GTB x30 ea GTB np     Heel raises NV seated 5"x20 seated 5"x20 seated 5"x20 seated 2x10 stand 2x10 stand x30 stand      Toe raises NV seated 5"x10 seated 5"x15 seated 5"x20 seated x10 stand 2x10 stand x20 stand      BAPS board NV 5"x20 ea DF/PF L1 5"x20 ea DF/PF L1 5"x20 ea 4-way L2 5"x20 ea 4-way L2 x20 ea 4-way L3 x20 ea 4-way L3 x25 ea 4-way L3     SLS    10"x10 R NV x10 to fatigue x10 to fatigue x10 to fatigue     HEP education and instruction x10'   x5'         Ther Activity                                       Gait Training                                       Modalities

## 2023-08-17 ENCOUNTER — OFFICE VISIT (OUTPATIENT)
Dept: PHYSICAL THERAPY | Facility: MEDICAL CENTER | Age: 68
End: 2023-08-17
Payer: MEDICARE

## 2023-08-17 DIAGNOSIS — D48.0 NEOPLASM OF UNCERTAIN BEHAVIOR OF BONE AND ARTICULAR CARTILAGE: ICD-10-CM

## 2023-08-17 DIAGNOSIS — S86.891S MEDIAL TIBIAL STRESS SYNDROME, RIGHT, SEQUELA: Primary | ICD-10-CM

## 2023-08-17 PROCEDURE — 97110 THERAPEUTIC EXERCISES: CPT | Performed by: PHYSICAL THERAPIST

## 2023-08-17 PROCEDURE — 97140 MANUAL THERAPY 1/> REGIONS: CPT | Performed by: PHYSICAL THERAPIST

## 2023-08-17 NOTE — PROGRESS NOTES
Daily Note     Today's date: 2023  Patient name: Anitra Cedeno  : 1955  MRN: [id number]  Referring provider: Mayelin Aguiar PA-C  Dx:   Encounter Diagnosis     ICD-10-CM    1. Medial tibial stress syndrome, right, sequela  S86.891S       2. 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 She notes that she is feeling better today with less pain this morning after resting from walking yesterday. Objective: See treatment diary below      Assessment: Continued with gentle 4-way manually resisted AROM to improve multiplanar ankle strength in a pain-free range, and patient tolerated manuals well. Held toe curls and medial/lateral ankle pulls due to report of recent discomfort. Able to return to standing heel and toe raises, which demonstrates good progress toward goals. Held progressions today to prevent overload due to report of recent pain. Patient tolerated treatment well, demonstrated appropriate fatigue post-tx, and was able to complete program without pain. Patient would benefit from continued PT to progress treatment as tolerated to be able to ambulate longer distances. Plan: Continue per plan of care.       Precautions: s/p R tibial bone biopsy (23), osteoporosis, hx of R tibial stress fracture , hx of R distal tib/fib stress fracture     *Potential adhesive allergy      HEP: strap gastroc stretch, ankle AROM (DF/PF/INV/EV)  Manuals  8/3 8/8 8/10 8/15 8/17    R ankle PROM NV KP KP KP KP KP       R ankle concentrics (AROM with manual resistance)      KP 4-way KP 4-way CB 4-way in diagnols KP 4-way                              Neuro Re-Ed             Toe Curls        x20 np    Medial/lateral ankle pulls        x20 np                              Ther Ex             Rec bike  5' 5' 5' 5' 5' 5' 5' 5'    Strap gastroc stretch 30"x3 HEP 30"x3 30"x4 30"x4 30"x4 30"x4 30"x4 30"x3 HEP    Ankle AROM x30 ea 4-way HEP HEP HEP HEP         Ankle tband 4-way NV x10 ea YTB x10 ea YTB 2x10 ea YTB x20 ea RTB 2x10 ea GTB x30 ea GTB np x10 ea HEP review GTB    Heel raises NV seated 5"x20 seated 5"x20 seated 5"x20 seated 2x10 stand 2x10 stand x30 stand  x20 stand    Toe raises NV seated 5"x10 seated 5"x15 seated 5"x20 seated x10 stand 2x10 stand x20 stand  x20 stand    BAPS board NV 5"x20 ea DF/PF L1 5"x20 ea DF/PF L1 5"x20 ea 4-way L2 5"x20 ea 4-way L2 x20 ea 4-way L3 x20 ea 4-way L3 x25 ea 4-way L3 x30 ea 4-way L3    SLS    10"x10 R NV x10 to fatigue x10 to fatigue x10 to fatigue x10 to fatigue    HEP education and instruction x10'   x5'         Ther Activity                                       Gait Training                                       Modalities

## 2023-08-22 ENCOUNTER — OFFICE VISIT (OUTPATIENT)
Dept: PHYSICAL THERAPY | Facility: MEDICAL CENTER | Age: 68
End: 2023-08-22
Payer: MEDICARE

## 2023-08-22 DIAGNOSIS — D48.0 NEOPLASM OF UNCERTAIN BEHAVIOR OF BONE AND ARTICULAR CARTILAGE: ICD-10-CM

## 2023-08-22 DIAGNOSIS — S86.891S MEDIAL TIBIAL STRESS SYNDROME, RIGHT, SEQUELA: Primary | ICD-10-CM

## 2023-08-22 PROCEDURE — 97140 MANUAL THERAPY 1/> REGIONS: CPT | Performed by: PHYSICAL THERAPIST

## 2023-08-22 PROCEDURE — 97110 THERAPEUTIC EXERCISES: CPT | Performed by: PHYSICAL THERAPIST

## 2023-08-22 NOTE — PROGRESS NOTES
Daily Note     Today's date: 2023  Patient name: Colette Martin  : 1955  MRN: [id number]  Referring provider: Elizabeth Griffith PA-C  Dx:   Encounter Diagnosis     ICD-10-CM    1. Medial tibial stress syndrome, right, sequela  S86.891S       2. 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 Objective: See treatment diary below      Assessment: Returned to gentle R ankle PROM and held AROM concentric strengthening due to baseline soreness today. Held INV/EV PROM/AROM due to report of R distal anterior tibial discomfort during INV/EV PROM/AROM. Performed gentle R ankle DF/PF AROM and held tband strengthening due to higher symptom irritability. Also performed heel/toe raises in a seated position today due to baseline soreness. Patient reported decreased stiffness and decreased pain post-tx and tolerated modified session well. Patient was educated to hold on tband strengthening during HEP and was educated to focus on gentle gastroc stretching/AROM exercises in a NWB position until next visit to allow soreness to subside. Patient would benefit from continued PT to improve ankle mobility and strength to return to PLOF. Plan: Continue per plan of care.       Precautions: s/p R tibial bone biopsy (23), osteoporosis, hx of R tibial stress fracture , hx of R distal tib/fib stress fracture     *Potential adhesive allergy      HEP: strap gastroc stretch, ankle AROM (DF/PF/INV/EV)  Manuals 7/13 7/25 7/27 7/31 8/3 8/8 8/10 8/15 8/17 8/22   R ankle PROM NV KP KP KP KP KP    KP DF/PF   R ankle concentrics (AROM with manual resistance)      KP 4-way KP 4-way CB 4-way in diagnols  4-way                              Neuro Re-Ed             Toe Curls        x20 np    Medial/lateral ankle pulls        x20 np                              Ther Ex             Rec bike  5' 5' 5' 5' 5' 5' 5' 5' 5'   Strap gastroc stretch 30"x3 HEP 30"x3 30"x4 30"x4 30"x4 30"x4 30"x4 30"x3 HEP 30"x4   Ankle AROM x30 ea 4-way HEP HEP HEP HEP      x30 DF/PF   Ankle tband 4-way NV x10 ea YTB x10 ea YTB 2x10 ea YTB x20 ea RTB 2x10 ea GTB x30 ea GTB np x10 ea HEP review GTB held   Heel raises NV seated 5"x20 seated 5"x20 seated 5"x20 seated 2x10 stand 2x10 stand x30 stand  x20 stand x20 seated   Toe raises NV seated 5"x10 seated 5"x15 seated 5"x20 seated x10 stand 2x10 stand x20 stand  x20 stand x20 seated   BAPS board NV 5"x20 ea DF/PF L1 5"x20 ea DF/PF L1 5"x20 ea 4-way L2 5"x20 ea 4-way L2 x20 ea 4-way L3 x20 ea 4-way L3 x25 ea 4-way L3 x30 ea 4-way L3 x20 ea 4-way L3   SLS    10"x10 R NV x10 to fatigue x10 to fatigue x10 to fatigue x10 to fatigue held   HEP education and instruction x10'   x5'         Ther Activity                                       Gait Training                                       Modalities

## 2023-08-24 ENCOUNTER — OFFICE VISIT (OUTPATIENT)
Dept: PHYSICAL THERAPY | Facility: MEDICAL CENTER | Age: 68
End: 2023-08-24
Payer: MEDICARE

## 2023-08-24 DIAGNOSIS — D48.0 NEOPLASM OF UNCERTAIN BEHAVIOR OF BONE AND ARTICULAR CARTILAGE: ICD-10-CM

## 2023-08-24 DIAGNOSIS — S86.891S MEDIAL TIBIAL STRESS SYNDROME, RIGHT, SEQUELA: Primary | ICD-10-CM

## 2023-08-24 PROCEDURE — 97110 THERAPEUTIC EXERCISES: CPT | Performed by: PHYSICAL THERAPIST

## 2023-08-24 PROCEDURE — 97140 MANUAL THERAPY 1/> REGIONS: CPT | Performed by: PHYSICAL THERAPIST

## 2023-08-24 NOTE — PROGRESS NOTES
Daily Note     Today's date: 2023  Patient name: Flakita Kendall  : 1955  MRN: [id number]  Referring provider: Quirino Barry PA-C  Dx:   Encounter Diagnosis     ICD-10-CM    1. Medial tibial stress syndrome, right, sequela  S86.891S       2. 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 Objective: See treatment diary below      Assessment: Continued with gentle R ankle DF/PF PROM due to mild ankle hypomobility today compared to previous sessions and due to report of decreased pain after this intervention at last session. Continued to hold on tband resisted exercises and CKC strengthening due to mild baseline soreness and due to positive response after reducing intensity last session. Added gentle INV/EV AROM today to improve multiplanar mobility. Patient was educated to hold on tband exercises until next visit to allow soreness to subside. Patient tolerated modified session well and would benefit from continued PT to improve ankle mobility and progress strengthening when appropriate to return to PLOF. Plan: Continue per plan of care.       Precautions: s/p R tibial bone biopsy (23), osteoporosis, hx of R tibial stress fracture , hx of R distal tib/fib stress fracture     *Potential adhesive allergy      HEP: strap gastroc stretch, ankle AROM (DF/PF/INV/EV)  Manuals  8/3 8/8 8/10 8/15 8/17 8/22 8/24   R ankle PROM NV KP KP KP KP KP    KP DF/PF KP DF/PF   R ankle concentrics (AROM with manual resistance)      KP 4-way KP 4-way CB 4-way in diagnols KP 4-way                                 Neuro Re-Ed              Toe Curls        x20 np     Medial/lateral ankle pulls        x20 np Ther Ex              Rec bike  5' 5' 5' 5' 5' 5' 5' 5' 5' 5'   Strap gastroc stretch 30"x3 HEP 30"x3 30"x4 30"x4 30"x4 30"x4 30"x4 30"x3 HEP 30"x4 30"x4   Ankle AROM x30 ea 4-way HEP HEP HEP HEP      x30 DF/PF x30 4-way   Ankle tband 4-way NV x10 ea YTB x10 ea YTB 2x10 ea YTB x20 ea RTB 2x10 ea GTB x30 ea GTB np x10 ea HEP review GTB held    Heel raises NV seated 5"x20 seated 5"x20 seated 5"x20 seated 2x10 stand 2x10 stand x30 stand  x20 stand x20 seated x20 seated   Toe raises NV seated 5"x10 seated 5"x15 seated 5"x20 seated x10 stand 2x10 stand x20 stand  x20 stand x20 seated x20 seated   BAPS board NV 5"x20 ea DF/PF L1 5"x20 ea DF/PF L1 5"x20 ea 4-way L2 5"x20 ea 4-way L2 x20 ea 4-way L3 x20 ea 4-way L3 x25 ea 4-way L3 x30 ea 4-way L3 x20 ea 4-way L3 x20 ea 4-way L3   SLS    10"x10 R NV x10 to fatigue x10 to fatigue x10 to fatigue x10 to fatigue held    HEP education and instruction x10'   x5'          Ther Activity                                          Gait Training                                          Modalities

## 2023-08-29 ENCOUNTER — OFFICE VISIT (OUTPATIENT)
Dept: PHYSICAL THERAPY | Facility: MEDICAL CENTER | Age: 68
End: 2023-08-29
Payer: MEDICARE

## 2023-08-29 DIAGNOSIS — S86.891S MEDIAL TIBIAL STRESS SYNDROME, RIGHT, SEQUELA: Primary | ICD-10-CM

## 2023-08-29 DIAGNOSIS — D48.0 NEOPLASM OF UNCERTAIN BEHAVIOR OF BONE AND ARTICULAR CARTILAGE: ICD-10-CM

## 2023-08-29 PROCEDURE — 97140 MANUAL THERAPY 1/> REGIONS: CPT | Performed by: PHYSICAL THERAPIST

## 2023-08-29 PROCEDURE — 97110 THERAPEUTIC EXERCISES: CPT | Performed by: PHYSICAL THERAPIST

## 2023-08-29 NOTE — PROGRESS NOTES
Daily Note     Today's date: 2023  Patient name: Jose M Michele  : 1955  MRN: [id number]  Referring provider: Kaity Gutierrez PA-C  Dx:   Encounter Diagnosis     ICD-10-CM    1. Medial tibial stress syndrome, right, sequela  S86.891S       2. 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 Objective: See treatment diary below      Assessment: Continued with manual interventions to address mild remaining limitations in end-range R ankle PROM and to improve multiplanar ankle strength. Patient demonstrated improved R ankle mobility and improved strength compared to last session. Able to return to 4-way ankle tband strengthening and standing heel/toe raises, which further demonstrates good progress toward goals. Patient reported mild anterior ankle discomfort during DF BAPS that resolved after continued reps. Patient tolerated treatment well, demonstrated appropriate fatigue post-tx, and was able to complete program without pain. Patient would benefit from continued PT to progress LE strengthening program to be able to ambulate longer distances. Plan: Continue per plan of care.       Precautions: s/p R tibial bone biopsy (23), osteoporosis, hx of R tibial stress fracture , hx of R distal tib/fib stress fracture     *Potential adhesive allergy      HEP: strap gastroc stretch, ankle AROM (DF/PF/INV/EV)  Manuals  8/3 8/8 8/10 8/15 8/17 8/22 8/24 8/29   R ankle PROM NV KP KP KP KP KP    KP DF/PF KP DF/PF KP 4-way   R ankle concentrics (AROM with manual resistance)      KP 4-way KP 4-way CB 4-way in diagnols KP 4-way   KP 4-way                                 Neuro Re-Ed               Toe Curls        x20 np      Medial/lateral ankle pulls        x20 np                                    Ther Ex               Rec bike  5' 5' 5' 5' 5' 5' 5' 5' 5' 5' 5'   Strap gastroc stretch 30"x3 HEP 30"x3 30"x4 30"x4 30"x4 30"x4 30"x4 30"x3 HEP 30"x4 30"x4 30"x4   Ankle AROM x30 ea 4-way HEP HEP HEP HEP      x30 DF/PF x30 4-way    Ankle tband 4-way NV x10 ea YTB x10 ea YTB 2x10 ea YTB x20 ea RTB 2x10 ea GTB x30 ea GTB np x10 ea HEP review GTB held  x20 4-way GTB   Heel raises NV seated 5"x20 seated 5"x20 seated 5"x20 seated 2x10 stand 2x10 stand x30 stand  x20 stand x20 seated x20 seated x20 stand   Toe raises NV seated 5"x10 seated 5"x15 seated 5"x20 seated x10 stand 2x10 stand x20 stand  x20 stand x20 seated x20 seated x20 stand   BAPS board NV 5"x20 ea DF/PF L1 5"x20 ea DF/PF L1 5"x20 ea 4-way L2 5"x20 ea 4-way L2 x20 ea 4-way L3 x20 ea 4-way L3 x25 ea 4-way L3 x30 ea 4-way L3 x20 ea 4-way L3 x20 ea 4-way L3 x20 ea 4-way L3   SLS    10"x10 R NV x10 to fatigue x10 to fatigue x10 to fatigue x10 to fatigue held  x10 to fatigue   HEP education and instruction x10'   x5'           Ther Activity                                             Gait Training                                             Modalities

## 2023-08-31 ENCOUNTER — OFFICE VISIT (OUTPATIENT)
Dept: PHYSICAL THERAPY | Facility: MEDICAL CENTER | Age: 68
End: 2023-08-31
Payer: MEDICARE

## 2023-08-31 DIAGNOSIS — S86.891S MEDIAL TIBIAL STRESS SYNDROME, RIGHT, SEQUELA: Primary | ICD-10-CM

## 2023-08-31 DIAGNOSIS — D48.0 NEOPLASM OF UNCERTAIN BEHAVIOR OF BONE AND ARTICULAR CARTILAGE: ICD-10-CM

## 2023-08-31 PROCEDURE — 97140 MANUAL THERAPY 1/> REGIONS: CPT | Performed by: PHYSICAL THERAPIST

## 2023-08-31 PROCEDURE — 97110 THERAPEUTIC EXERCISES: CPT | Performed by: PHYSICAL THERAPIST

## 2023-08-31 NOTE — PROGRESS NOTES
Daily Note     Today's date: 2023  Patient name: Lucero Odom  : 1955  MRN: [id number]  Referring provider: Keny Back PA-C  Dx:   Encounter Diagnosis     ICD-10-CM    1. Medial tibial stress syndrome, right, sequela  S86.891S       2. 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 She is having less pain compared to last week. Objective: See treatment diary below      Assessment: Continued with manual interventions to improve ankle mobility and stability. Patient continues to demonstrate good progress with improving R ankle AROM and PROM. Able to progress resistance for 4-way ankle tband strengthening, which further demonstrates good progress toward goals. Able to progress reps for HR/TR, which also indicates that she is improving in strength. Added wall ball squats to improve CKC DF mobility and global R LE strength with cueing provided to prevent excessive anterior tibial translation. Patient tolerated treatment well and completed program without pain. Patient would benefit from continued PT to improve ankle mobility and strength to be able to ambulate longer distances. Plan: Continue per plan of care.       Precautions: s/p R tibial bone biopsy (23), osteoporosis, hx of R tibial stress fracture , hx of R distal tib/fib stress fracture     *Potential adhesive allergy      HEP: strap gastroc stretch, ankle AROM (DF/PF/INV/EV)  Manuals 8/31 7/13 7/25 7/27 7/31 8/3 8/8 8/10 8/15 8/17 8/22 8/24 8/29   R ankle PROM KP NV KP KP KP KP KP    KP DF/PF KP DF/PF KP 4-way   R ankle concentrics (AROM with manual resistance) KP      KP 4-way KP 4-way CB 4-way in diagnols KP 4-way   KP 4-way                                   Neuro Re-Ed                Toe Curls         x20 np Medial/lateral ankle pulls         x20 np                                      Ther Ex                Rec bike 5'  5' 5' 5' 5' 5' 5' 5' 5' 5' 5' 5'   Strap gastroc stretch 30"x4 30"x3 HEP 30"x3 30"x4 30"x4 30"x4 30"x4 30"x4 30"x3 HEP 30"x4 30"x4 30"x4   Ankle AROM  x30 ea 4-way HEP HEP HEP HEP      x30 DF/PF x30 4-way    Ankle tband 4-way x20 ea blue NV x10 ea YTB x10 ea YTB 2x10 ea YTB x20 ea RTB 2x10 ea GTB x30 ea GTB np x10 ea HEP review GTB held  x20 4-way GTB   Heel raises x30 stand NV seated 5"x20 seated 5"x20 seated 5"x20 seated 2x10 stand 2x10 stand x30 stand  x20 stand x20 seated x20 seated x20 stand   Toe raises x30 stand NV seated 5"x10 seated 5"x15 seated 5"x20 seated x10 stand 2x10 stand x20 stand  x20 stand x20 seated x20 seated x20 stand   BAPS board x30 ea 4-way  NV 5"x20 ea DF/PF L1 5"x20 ea DF/PF L1 5"x20 ea 4-way L2 5"x20 ea 4-way L2 x20 ea 4-way L3 x20 ea 4-way L3 x25 ea 4-way L3 x30 ea 4-way L3 x20 ea 4-way L3 x20 ea 4-way L3 x20 ea 4-way L3   SLS x10 to fatigue    10"x10 R NV x10 to fatigue x10 to fatigue x10 to fatigue x10 to fatigue held  x10 to fatigue   Wall ball squats 2x10               HEP education and instruction  x10'   x5'           Ther Activity                                                Gait Training                                                Modalities

## 2023-09-05 ENCOUNTER — OFFICE VISIT (OUTPATIENT)
Dept: PHYSICAL THERAPY | Facility: MEDICAL CENTER | Age: 68
End: 2023-09-05
Payer: MEDICARE

## 2023-09-05 DIAGNOSIS — S86.891S MEDIAL TIBIAL STRESS SYNDROME, RIGHT, SEQUELA: Primary | ICD-10-CM

## 2023-09-05 DIAGNOSIS — D48.0 NEOPLASM OF UNCERTAIN BEHAVIOR OF BONE AND ARTICULAR CARTILAGE: ICD-10-CM

## 2023-09-05 PROCEDURE — 97110 THERAPEUTIC EXERCISES: CPT | Performed by: PHYSICAL THERAPIST

## 2023-09-05 PROCEDURE — 97140 MANUAL THERAPY 1/> REGIONS: CPT | Performed by: PHYSICAL THERAPIST

## 2023-09-05 NOTE — PROGRESS NOTES
PT Re-Evaluation     Today's date: 2023  Patient name: Carmen Elizabeth  : 1955  MRN: [id number]  Referring provider: Chrystal Mchugh PA-C  Dx:   Encounter Diagnosis     ICD-10-CM    1. Medial tibial stress syndrome, right, sequela  S86.891S       2. 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 She is about 50-60% improved since her initial visit, and she would like to continue to work on her strength to be able to walk longer distances. Objective: See treatment diary below    R ankle AROM and PROM: WFL and nearly comparable to the contralateral side in all planes except for very mild limitation in end-range R ankle DF AROM/PROM     R ankle strength: 4-/5 all planes      Assessment: Patient has demonstrated excellent progress with reducing the swelling in her R anterior ankle and improving her R ankle AROM and PROM in all planes since her initial visit. This has improved her quality of gait mechanics for shorter distance ambulation. Although improved significantly over the last few weeks, patient demonstrates moderate multiplanar strength deficits in her R ankle vs the contralateral side. This prevents her from ambulating long community distances and participating in her recreational activities compared to her prior capacity. Patient is demonstrating good progress toward her overall goals and has been compliant with both PT session attendance and HEP performance. Continued with manual therapy and TE progressions as outlined below to further improve ankle stability, and patient completed program in a pain-free range.  Patient would benefit from continued PT to further improve R LE strength to restore full participation in long distance ambulation and active lifestyle. Goals  STG:  Patient will be independent with home exercise program.- met   Patient will demonstrate decreased swelling in R anterior ankle to improve mobility for ambulation.- met  LTG:  Patient will increase R ankle DF AROM to at least 5-10 deg to improve quality of gait mechanics. - in progress (improved)  Patient will increase R ankle strength to at least 4+/5 in all planes to be able to stand for longer periods. - in progress (improved)  Patient will be able to ambulate longer distances in the community.- in progress  Patient will be able to participate in tennis with modifications as necessary.- in progress  Patient will be able to manage symptoms independently. - in progress        Plan: 2x/week for 4 more weeks.      Precautions: s/p R tibial bone biopsy (5/8/23), osteoporosis, hx of R tibial stress fracture 2015, hx of R distal tib/fib stress fracture 2018    *Potential adhesive allergy        Manuals 8/31 9/5   R ankle PROM KP KP   R ankle concentrics (AROM with manual resistance) KP KP             Neuro Re-Ed               Ther Ex     Rec bike 5' 5'   Strap gastroc stretch 30"x4 30"x4   Ankle tband 4-way x20 ea blue x30 ea blue   Heel raises x30 stand x30 stand   Toe raises x30 stand x30 stand   BAPS board x30 ea 4-way  x30 ea 4-way L3   SLS x10 to fatigue x10 to fatigue airex   Wall ball squats 2x10 x20   Leg press  2x10 seated seat 5 50# DL   HEP education and instruction     Ther Activity               Gait Training               Modalities

## 2023-09-07 ENCOUNTER — OFFICE VISIT (OUTPATIENT)
Dept: PHYSICAL THERAPY | Facility: MEDICAL CENTER | Age: 68
End: 2023-09-07
Payer: MEDICARE

## 2023-09-07 DIAGNOSIS — D48.0 NEOPLASM OF UNCERTAIN BEHAVIOR OF BONE AND ARTICULAR CARTILAGE: ICD-10-CM

## 2023-09-07 DIAGNOSIS — S86.891S MEDIAL TIBIAL STRESS SYNDROME, RIGHT, SEQUELA: Primary | ICD-10-CM

## 2023-09-07 PROCEDURE — 97140 MANUAL THERAPY 1/> REGIONS: CPT | Performed by: PHYSICAL THERAPIST

## 2023-09-07 PROCEDURE — 97110 THERAPEUTIC EXERCISES: CPT | Performed by: PHYSICAL THERAPIST

## 2023-09-07 NOTE — PROGRESS NOTES
Daily Note     Today's date: 2023  Patient name: Tessie Ledezma  : 1955  MRN: [id number]  Referring provider: Seema Pablo PA-C  Dx:   Encounter Diagnosis     ICD-10-CM    1. Medial tibial stress syndrome, right, sequela  S86.891S       2. 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 Overall, she is able to do more of her usual activity compared to previously. Objective: See treatment diary below      Assessment: R ankle AROM and PROM WFL and comparable bilaterally. Continued with manual concentric strengthening, and patient initially reported mild R posterior knee/proximal gastroc discomfort during resisted ankle DF that was reduced in intensity after gastroc stretching. Continued with ankle strengthening program as outlined below. Held progressions due to report of soreness after last session and due to baseline soreness today. Improved control noted during SLS on airex, which demonstrates good progress toward goals. Patient tolerated treatment well, demonstrated appropriate fatigue post-tx, and completed program without pain. Patient would benefit from continued PT to progress strengthening to facilitate a full return to active lifestyle. Plan: Continue per plan of care.       Precautions: s/p R tibial bone biopsy (23), osteoporosis, hx of R tibial stress fracture , hx of R distal tib/fib stress fracture     *Potential adhesive allergy        Manuals    R ankle PROM [facility]    R ankle concentrics (AROM with manual resistance) [facility]               Neuro Re-Ed                  Ther Ex      Rec bike 5' 5' 5'   Strap gastroc stretch 30"x4 30"x4 30"x4   Ankle tband 4-way x20 ea blue x30 ea blue x30 ea blue   Heel raises x30 stand x30 stand x30 stand   Toe raises x30 stand x30 stand x30 stand   BAPS board x30 ea 4-way  x30 ea 4-way L3 x30 ea 4-way L3   SLS x10 to fatigue x10 to fatigue airex x10 to fatigue airex   Wall ball squats 2x10 x20 x20   Leg press  2x10 seated seat 5 50# DL 2x10 seated seat 5 50# DL   HEP education and instruction      Ther Activity                  Gait Training                  Modalities

## 2023-09-12 ENCOUNTER — OFFICE VISIT (OUTPATIENT)
Dept: PHYSICAL THERAPY | Facility: MEDICAL CENTER | Age: 68
End: 2023-09-12
Payer: MEDICARE

## 2023-09-12 DIAGNOSIS — S86.891S MEDIAL TIBIAL STRESS SYNDROME, RIGHT, SEQUELA: Primary | ICD-10-CM

## 2023-09-12 DIAGNOSIS — D48.0 NEOPLASM OF UNCERTAIN BEHAVIOR OF BONE AND ARTICULAR CARTILAGE: ICD-10-CM

## 2023-09-12 PROCEDURE — 97140 MANUAL THERAPY 1/> REGIONS: CPT | Performed by: PHYSICAL THERAPIST

## 2023-09-12 PROCEDURE — 97110 THERAPEUTIC EXERCISES: CPT | Performed by: PHYSICAL THERAPIST

## 2023-09-12 NOTE — PROGRESS NOTES
Daily Note     Today's date: 2023  Patient name: Adam Ireland  : 1955  MRN: [id number]  Referring provider: Vini Bland PA-C  Dx:   Encounter Diagnosis     ICD-10-CM    1. Medial tibial stress syndrome, right, sequela  S86.891S       2. 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 She has not trialed longer distance walking, but she is able to walk for longer periods of time within her house. She also reports that the trialed returning to her tband strengthening exercises over the weekend, but she noticed some pulsing and fatigue in the front of her shin. Objective: See treatment diary below      Assessment: Patient continues to demonstrate R ankle AROM and PROM improvements in all planes. Continued with manual concentric strengthening, and she tolerated manual therapy well. Decreased resistance of tband strengthening compared to last session due to report of fatigue when performing this exercise during HEP. No visible edema or abnormalities noted during 4-way tband. Continued with ankle stability TE as outlined below. Less fatigue noted during toe raises today, and she was able to progress resistance for leg press. This demonstrates good progress toward goals. Patient tolerated treatment well and completed program without pain. Patient would benefit from continued PT to further progress strengthening program to be able to ambulate longer distances. Plan: Continue per plan of care.       Precautions: s/p R tibial bone biopsy (23), osteoporosis, hx of R tibial stress fracture , hx of R distal tib/fib stress fracture     *Potential adhesive allergy        Manuals    R ankle PROM KP KP     R ankle concentrics (AROM with manual resistance) KP KP KP KP Neuro Re-Ed                     Ther Ex       Rec bike 5' 5' 5' 5'   Strap gastroc stretch 30"x4 30"x4 30"x4 30"x4   Ankle tband 4-way x20 ea blue x30 ea blue x30 ea blue x30 ea green   Heel raises x30 stand x30 stand x30 stand x30 stand   Toe raises x30 stand x30 stand x30 stand x30 stand   BAPS board x30 ea 4-way  x30 ea 4-way L3 x30 ea 4-way L3 x30 ea 4-way L3   SLS x10 to fatigue x10 to fatigue airex x10 to fatigue airex x10 to fatigue airex   Wall ball squats 2x10 x20 x20 x20   Leg press  2x10 seated seat 5 50# DL 2x10 seated seat 5 50# DL 2x10 seated seat 5 60# DL   HEP education and instruction       Ther Activity                     Gait Training                     Modalities

## 2023-09-15 ENCOUNTER — APPOINTMENT (OUTPATIENT)
Dept: PHYSICAL THERAPY | Facility: MEDICAL CENTER | Age: 68
End: 2023-09-15
Payer: MEDICARE

## 2023-09-18 ENCOUNTER — OFFICE VISIT (OUTPATIENT)
Dept: PHYSICAL THERAPY | Facility: MEDICAL CENTER | Age: 68
End: 2023-09-18
Payer: MEDICARE

## 2023-09-18 DIAGNOSIS — S86.891S MEDIAL TIBIAL STRESS SYNDROME, RIGHT, SEQUELA: Primary | ICD-10-CM

## 2023-09-18 DIAGNOSIS — D48.0 NEOPLASM OF UNCERTAIN BEHAVIOR OF BONE AND ARTICULAR CARTILAGE: ICD-10-CM

## 2023-09-18 PROCEDURE — 97140 MANUAL THERAPY 1/> REGIONS: CPT | Performed by: PHYSICAL THERAPIST

## 2023-09-18 PROCEDURE — 97110 THERAPEUTIC EXERCISES: CPT | Performed by: PHYSICAL THERAPIST

## 2023-09-18 NOTE — PROGRESS NOTES
Daily Note     Today's date: 2023  Patient name: Nabil Hurtado  : 1955  MRN: [id number]  Referring provider: Damion Bailey PA-C  Dx:   Encounter Diagnosis     ICD-10-CM    1. Medial tibial stress syndrome, right, sequela  S86.891S       2. 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 She notes that 4 days ago, she developed increased pain in the front of her shin that radiated up into her R thigh and R hip. This pain has persisted for the last few days. She is feeling slightly better today after resting and not being on her feet as much. Objective: See treatment diary below      Assessment: Performed gentle R ankle PROM and held concentric strengthening due to baseline soreness. She reported decreased R anterior ankle stiffness after PROM. Also added STM to R gastroc to reduce soft tissue restrictions and decrease stress on R anterior tibialis when ambulating. Held CKC strengthening and performed HR/TR in seated today to prevent overload due to report of recent increased symptom intensity. Patient was educated to focus on stretching during HEP and to rest from strengthening until soreness subsides. Patient tolerated modified session well and would benefit from continued PT to further improve ankle strength as appropriate to maximize function. Plan: Continue per plan of care.       Precautions: s/p R tibial bone biopsy (23), osteoporosis, hx of R tibial stress fracture , hx of R distal tib/fib stress fracture     *Potential adhesive allergy        Manuals    R ankle PROM KP KP   KP   R ankle concentrics (AROM with manual resistance) KP KP KP KP    STM to R gastroc     KP           Neuro Re-Ed                        Ther Ex        Rec bike 5' 5' 5' 5' 5'   Strap gastroc stretch 30"x4 30"x4 30"x4 30"x4 HEP Ankle tband 4-way x20 ea blue x30 ea blue x30 ea blue x30 ea green HEP   Heel raises x30 stand x30 stand x30 stand x30 stand x20 seated   Toe raises x30 stand x30 stand x30 stand x30 stand x20 seated   BAPS board x30 ea 4-way  x30 ea 4-way L3 x30 ea 4-way L3 x30 ea 4-way L3 x30 ea 4-way L3   SLS x10 to fatigue x10 to fatigue airex x10 to fatigue airex x10 to fatigue airex held   Wall ball squats 2x10 x20 x20 x20 held   Leg press  2x10 seated seat 5 50# DL 2x10 seated seat 5 50# DL 2x10 seated seat 5 60# DL held   Wall gastroc stretch     30"x4   Wall soleus stretch     30"x4   HEP education and instruction        Ther Activity                        Gait Training                        Modalities

## 2023-09-19 ENCOUNTER — APPOINTMENT (OUTPATIENT)
Dept: PHYSICAL THERAPY | Facility: MEDICAL CENTER | Age: 68
End: 2023-09-19
Payer: MEDICARE

## 2023-09-21 ENCOUNTER — OFFICE VISIT (OUTPATIENT)
Dept: PHYSICAL THERAPY | Facility: MEDICAL CENTER | Age: 68
End: 2023-09-21
Payer: MEDICARE

## 2023-09-21 DIAGNOSIS — D48.0 NEOPLASM OF UNCERTAIN BEHAVIOR OF BONE AND ARTICULAR CARTILAGE: ICD-10-CM

## 2023-09-21 DIAGNOSIS — S86.891S MEDIAL TIBIAL STRESS SYNDROME, RIGHT, SEQUELA: Primary | ICD-10-CM

## 2023-09-21 PROCEDURE — 97110 THERAPEUTIC EXERCISES: CPT | Performed by: PHYSICAL THERAPIST

## 2023-09-21 PROCEDURE — 97140 MANUAL THERAPY 1/> REGIONS: CPT | Performed by: PHYSICAL THERAPIST

## 2023-09-21 NOTE — PROGRESS NOTES
Daily Note     Today's date: 2023  Patient name: Camilla Hale  : 1955  MRN: [id number]  Referring provider: Hardy Gurrola PA-C  Dx:   Encounter Diagnosis     ICD-10-CM    1. Medial tibial stress syndrome, right, sequela  S86.891S       2. 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 However, she continues to have a lot of tightness throughout her entire R leg. She notes that she went to a massage therapist 2 days ago, which also helped relieve some tightness. Objective: See treatment diary below      Assessment: Patient presented with significant soft tissue restrictions in R quadriceps today. Addressed soft tissue restrictions with roller STM to R quadriceps and R gastroc to further improve quality of gait mechanics and to reduce compensatory stress on R anterior ankle when ambulating. Added prone quad stretch to further improve R LE flexibility, and patient was educated to add prone quad stretch to HEP to improve proximal mobility and decrease excessive strain on R tibia. Able to return to manually resisted ankle strengthening, tband strengthening, and CKC strengthening today due to lower symptom irritability. Patient was also educated to add wall gastroc stretch to HEP to improve CKC ankle mobility. Patient tolerated treatment well and completed program without pain. Patient would benefit from continued PT to further improve R LE mobility and strength to be able to ambulate longer distances. Plan: Continue per plan of care.       Precautions: s/p R tibial bone biopsy (23), osteoporosis, hx of R tibial stress fracture , hx of R distal tib/fib stress fracture     *Potential adhesive allergy        Manuals    R ankle PROM KP KP   KP    R ankle concentrics (AROM with manual resistance) KP KP KP KP  KP   STM to R gastroc     KP KP with roller + R quad            Neuro Re-Ed                           Ther Ex         Rec bike 5' 5' 5' 5' 5' 5'   Strap gastroc stretch 30"x4 30"x4 30"x4 30"x4 HEP    Ankle tband 4-way x20 ea blue x30 ea blue x30 ea blue x30 ea green HEP    Prone quad stretch      30"x4   Heel raises x30 stand x30 stand x30 stand x30 stand x20 seated x20 stand   Toe raises x30 stand x30 stand x30 stand x30 stand x20 seated x20 stand   BAPS board x30 ea 4-way  x30 ea 4-way L3 x30 ea 4-way L3 x30 ea 4-way L3 x30 ea 4-way L3 x30 ea 4-way L3   SLS x10 to fatigue x10 to fatigue airex x10 to fatigue airex x10 to fatigue airex held x10 to fatigue airex   Wall ball squats 2x10 x20 x20 x20 held x20   Leg press  2x10 seated seat 5 50# DL 2x10 seated seat 5 50# DL 2x10 seated seat 5 60# DL held 2x10 seated seat 5 60# DL   Wall gastroc stretch     30"x4 30"x4   Wall soleus stretch     30"x4    HEP education and instruction         Ther Activity                           Gait Training                           Modalities

## 2023-09-26 ENCOUNTER — OFFICE VISIT (OUTPATIENT)
Dept: PHYSICAL THERAPY | Facility: MEDICAL CENTER | Age: 68
End: 2023-09-26
Payer: MEDICARE

## 2023-09-26 DIAGNOSIS — D48.0 NEOPLASM OF UNCERTAIN BEHAVIOR OF BONE AND ARTICULAR CARTILAGE: ICD-10-CM

## 2023-09-26 DIAGNOSIS — S86.891S MEDIAL TIBIAL STRESS SYNDROME, RIGHT, SEQUELA: Primary | ICD-10-CM

## 2023-09-26 PROCEDURE — 97140 MANUAL THERAPY 1/> REGIONS: CPT | Performed by: PHYSICAL THERAPIST

## 2023-09-26 PROCEDURE — 97110 THERAPEUTIC EXERCISES: CPT | Performed by: PHYSICAL THERAPIST

## 2023-09-26 NOTE — PROGRESS NOTES
Daily Note     Today's date: 2023  Patient name: Rohith Allen  : 1955  MRN: [id number]  Referring provider: Tommy Naranjo PA-C  Dx:   Encounter Diagnosis     ICD-10-CM    1. Medial tibial stress syndrome, right, sequela  S86.891S       2. 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 She notes that she felt relief in the tightness in her R leg after the STM last session. Objective: See treatment diary below      Assessment: Continued to manually resisted R ankle AROM to improve multiplanar strength with patient demonstrating improved eccentric control in all planes. Also continued with  STM to R quad and gastroc to reduce soft tissue restrictions and decrease compensatory stress on R ankle when ambulating. Able to increase resistance for 4-way ankle strengthening in the clinic and during HEP, which demonstrates good progress toward goals. Performed toe raises on R LE due to report of L lateral foot discomfort when performing this intervention bilaterally. Able to progress heel raises to be performed SL, which further demonstrates an improvement in R LE strength. Patient tolerated treatment well and completed program without pain. Patient would benefit from continued PT to progress strengthening to return to longer distance ambulation. Plan: Continue per plan of care.       Precautions: s/p R tibial bone biopsy (23), osteoporosis, hx of R tibial stress fracture , hx of R distal tib/fib stress fracture     *Potential adhesive allergy        Manuals    R ankle PROM KP KP   KP     R ankle concentrics (AROM with manual resistance) KP KP KP KP  KP KP   STM to R gastroc     KP KP with roller + R quad KP with roller + R quad Neuro Re-Ed                              Ther Ex          Rec bike 5' 5' 5' 5' 5' 5' 5'   Strap gastroc stretch 30"x4 30"x4 30"x4 30"x4 HEP     Ankle tband 4-way x20 ea blue x30 ea blue x30 ea blue x30 ea green HEP  x20 ea blue   Prone quad stretch      30"x4 30"x4   Heel raises x30 stand x30 stand x30 stand x30 stand x20 seated x20 stand 5"x10 stand SL   Toe raises x30 stand x30 stand x30 stand x30 stand x20 seated x20 stand x20 stand just R   BAPS board x30 ea 4-way  x30 ea 4-way L3 x30 ea 4-way L3 x30 ea 4-way L3 x30 ea 4-way L3 x30 ea 4-way L3 x20 ea 4-way L3 5#   SLS x10 to fatigue x10 to fatigue airex x10 to fatigue airex x10 to fatigue airex held x10 to fatigue airex x10 to fatigue airex   Wall ball squats 2x10 x20 x20 x20 held x20 x30   Leg press  2x10 seated seat 5 50# DL 2x10 seated seat 5 50# DL 2x10 seated seat 5 60# DL held 2x10 seated seat 5 60# DL 2x10 seated seat 5 70# DL   Wall gastroc stretch     30"x4 30"x4 HEP   Wall soleus stretch     30"x4     HEP education and instruction          Ther Activity                              Gait Training                              Modalities

## 2023-09-28 ENCOUNTER — OFFICE VISIT (OUTPATIENT)
Dept: PHYSICAL THERAPY | Facility: MEDICAL CENTER | Age: 68
End: 2023-09-28
Payer: MEDICARE

## 2023-09-28 DIAGNOSIS — S86.891S MEDIAL TIBIAL STRESS SYNDROME, RIGHT, SEQUELA: Primary | ICD-10-CM

## 2023-09-28 DIAGNOSIS — D48.0 NEOPLASM OF UNCERTAIN BEHAVIOR OF BONE AND ARTICULAR CARTILAGE: ICD-10-CM

## 2023-09-28 PROCEDURE — 97140 MANUAL THERAPY 1/> REGIONS: CPT

## 2023-09-28 PROCEDURE — 97110 THERAPEUTIC EXERCISES: CPT

## 2023-09-28 NOTE — PROGRESS NOTES
Daily Note     Today's date: 2023  Patient name: Luis Quintanilla  : 1955  MRN: [id number]  Referring provider: Krystin Ayoub PA-C  Dx:   Encounter Diagnosis     ICD-10-CM    1. Medial tibial stress syndrome, right, sequela  S86.891S       2. 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 Objective: See treatment diary below      Assessment: Tolerated treatment well. Patient demonstrated fatigue post treatment, exhibited good technique with therapeutic exercises and would benefit from continued PT  Pt is responding well to current tx plan. Compensation of quad and hip compensation with ankle ex's was addressed and experienced less pain when performing these ex's. Plan: Continue per plan of care.       Precautions: s/p R tibial bone biopsy (23), osteoporosis, hx of R tibial stress fracture , hx of R distal tib/fib stress fracture     *Potential adhesive allergy        Manuals    R ankle PROM     KP     R ankle concentrics (AROM with manual resistance) KO  KP KP  KP KP   STM to R gastroc and R  Quad with  roller KO    KP KP with roller + R quad KP with roller + R quad             Neuro Re-Ed                              Ther Ex          Rec bike 5'  5' 5' 5' 5' 5'   Strap gastroc stretch HEP  30"x4 30"x4 HEP     Ankle tband 4-way x20 ea blue  x30 ea blue x30 ea green HEP  x20 ea blue   Prone quad stretch 30"x4     30"x4 30"x4   Standing SL Heel raises 5"  x10    x30 stand x30 stand x20 seated x20 stand 5"x10 stand SL   Toe raises -  Stand x20  R  x30 stand x30 stand x20 seated x20 stand x20 stand just R   BAPS board x20 ea 4-way   L3    x30 ea 4-way L3 x30 ea 4-way L3 x30 ea 4-way L3 x30 ea 4-way L3 x20 ea 4-way L3 5#   SLS x10 to fatigue  AE  x10 to fatigue airex x10 to fatigue airex held x10 to fatigue airex x10 to fatigue airex   Wall ball squats x30  x20 x20 held x20 x30   Leg press   2x10 seated seat 5 50# DL 2x10 seated seat 5 60# DL held 2x10 seated seat 5 60# DL 2x10 seated seat 5 70# DL   Wall gastroc stretch     30"x4 30"x4 HEP   Wall soleus stretch     30"x4     HEP education and instruction          Ther Activity                              Gait Training                              Modalities

## 2023-10-03 ENCOUNTER — OFFICE VISIT (OUTPATIENT)
Dept: PHYSICAL THERAPY | Facility: MEDICAL CENTER | Age: 68
End: 2023-10-03
Payer: MEDICARE

## 2023-10-03 DIAGNOSIS — D48.0 NEOPLASM OF UNCERTAIN BEHAVIOR OF BONE AND ARTICULAR CARTILAGE: ICD-10-CM

## 2023-10-03 DIAGNOSIS — S86.891S MEDIAL TIBIAL STRESS SYNDROME, RIGHT, SEQUELA: Primary | ICD-10-CM

## 2023-10-03 PROCEDURE — 97110 THERAPEUTIC EXERCISES: CPT | Performed by: PHYSICAL THERAPIST

## 2023-10-03 PROCEDURE — 97140 MANUAL THERAPY 1/> REGIONS: CPT | Performed by: PHYSICAL THERAPIST

## 2023-10-03 NOTE — PROGRESS NOTES
PT Re-Evaluation    Today's date: 10/3/2023  Patient name: Adama Reed  : 1955  MRN: [id number]  Referring provider: Lindsay Sheth PA-C  Dx:   Encounter Diagnosis     ICD-10-CM    1. Medial tibial stress syndrome, right, sequela  S86.891S       2. 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 She was able to go for a 10 minute walk yesterday with no pain. She would like to continue to be able to work on her ability to walk longer distances consistently. She also brought an updated script to begin PT for her R shoulder. She is agreeable to focusing on her R ankle at this time and addressing her R shoulder in the upcoming future. Objective: See treatment diary below    Ankle AROM:   WFL and symmetrical bilaterally all planes    Ankle strength:  L: 5/5 all planes  R: 4/5 all planes    SLS:  L: 10s, R: 5s (increased ankle sway and lateral trunk lean)    Moderate soft tissue restrictions in R quad, mild soft tissue restrictions in R gastroc    Assessment: Patient has demonstrated excellent progress with improving R ankle AROM and PROM, and [facility] and comparable to the contralateral side. This has improved her quality of gait mechanics for shorter distance ambulation. R ankle strength is also continuing to improve, which has facilitated an improvement in her ability to stand for longer durations compared to her initial visit. Although improved significantly over the last few weeks, patient continues to demonstrate strength deficits in her R ankle when compared to her contralateral side along with concurrent balance dysfunction on her R LE. This prevents her from ambulating longer community distances to her prior capacity.  In addition, she demonstrates moderate soft tissue tightness in her R quadriceps, which further contributes to excessive compensatory stress on her R ankle when participating in ADL. Patient is demonstrating steady progress toward her goals and has been compliant with PT session attendance and HEP performance. She responded well to manual therapy and strengthening interventions today and was able to complete program without pain. Patient would benefit from continued PT to further progress strengthening program to facilitate a full return to consistent longer distance ambulation. Goals  STG:  Patient will be independent with home exercise program.- met   Patient will demonstrate decreased swelling in R anterior ankle to improve mobility for ambulation.- met  LTG:  Patient will increase R ankle DF AROM to at least 5-10 deg to improve quality of gait mechanics. - met  Patient will increase R ankle strength to at least 4+/5 in all planes to be able to stand for longer periods. - in progress (improved)  Patient will be able to ambulate longer distances in the community.- in progress  Patient will be able to participate in tennis with modifications as necessary.- in progress  Patient will be able to manage symptoms independently. - in progress     Plan: 2x/week tapering to 1x/week over the next 4 weeks. Will evaluate and treat R shoulder as R ankle symptoms improve.      Precautions: s/p R tibial bone biopsy (5/8/23), osteoporosis, hx of R tibial stress fracture 2015, hx of R distal tib/fib stress fracture 2018    *Potential adhesive allergy        Manuals 9/28 10/3   R ankle PROM     R ankle concentrics (AROM with manual resistance) KEAGAN JEAN   STM to R gastroc and R  Quad with  roller KEAGAN JEAN        Neuro Re-Ed               Ther Ex     Rec bike 5' 5'   Strap gastroc stretch HEP HEP   Ankle tband 4-way x20 ea blue HEP   Prone quad stretch 30"x4 HEP   Standing SL Heel raises 5"  x10   5" 2x10   Toe raises -  Stand x20  R 5"x30 stand   BAPS board x20 ea 4-way   L3   np   SLS x10 to fatigue  AE x10 to fatigue AE Wall ball squats x30 x30   Leg press  3x10 DL seated seat 5 70#   HEP education and instruction     Ther Activity               Gait Training               Modalities

## 2023-10-05 ENCOUNTER — OFFICE VISIT (OUTPATIENT)
Dept: PHYSICAL THERAPY | Facility: MEDICAL CENTER | Age: 68
End: 2023-10-05
Payer: MEDICARE

## 2023-10-05 DIAGNOSIS — S86.891S MEDIAL TIBIAL STRESS SYNDROME, RIGHT, SEQUELA: Primary | ICD-10-CM

## 2023-10-05 DIAGNOSIS — D48.0 NEOPLASM OF UNCERTAIN BEHAVIOR OF BONE AND ARTICULAR CARTILAGE: ICD-10-CM

## 2023-10-05 PROCEDURE — 97110 THERAPEUTIC EXERCISES: CPT | Performed by: PHYSICAL THERAPIST

## 2023-10-05 PROCEDURE — 97140 MANUAL THERAPY 1/> REGIONS: CPT | Performed by: PHYSICAL THERAPIST

## 2023-10-05 NOTE — PROGRESS NOTES
Daily Note     Today's date: 10/5/2023  Patient name: Tanisha Tapia  : 1955  MRN: [id number]  Referring provider: Cata Jaramillo PA-C  Dx:   Encounter Diagnosis     ICD-10-CM    1. Medial tibial stress syndrome, right, sequela  S86.891S       2. 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 She did not have an opportunity to trial longer distance walking since last visit, but she was able to walk shorter distances on grass at a cross country meet yesterday without pain. Objective: See treatment diary below      Assessment: Continued with STM with roller to R quad and R gastroc to reduce soft tissue restrictions and decrease stress on R anterior knee when ambulating. Patient also continues to demonstrate improved ankle INV/EV strength during manually resisted AROM. Able to progress reps for SL heel raises and increase duration of hold time during SLS, which demonstrates an improvement in R ankle strength. Also able to progress resistance for leg press, which further demonstrates good progress toward goals. Cueing provided during leg press to prevent shoulder elevation. Patient tolerated treatment well and completed program without pain. Patient would benefit from continued PT to further progress R LE strengthening program to return to longer distance ambulation. Plan: Continue per plan of care.       Precautions: s/p R tibial bone biopsy (23), osteoporosis, hx of R tibial stress fracture , hx of R distal tib/fib stress fracture     *Potential adhesive allergy        Manuals 9/28 10/3 10/5   R ankle PROM      R ankle concentrics (AROM with manual resistance) [facility]   STM to R gastroc and R  Quad with  roller KEAGAN [facility]         Neuro Re-Ed                  Ther Ex      Rec bike 5' 5' 5'   Strap gastroc stretch HEP HEP    Ankle tband 4-way x20 ea blue HEP Prone quad stretch 30"x4 HEP    Standing SL Heel raises 5"  x10   5" 2x10 5"x30   Toe raises -  Stand x20  R 5"x30 stand 5"x30   BAPS board x20 ea 4-way   L3   np    SLS x10 to fatigue  AE x10 to fatigue AE x10 to fatigue AE   Wall ball squats x30 x30 x30   Leg press  3x10 DL seated seat 5 70# 3x10 DL seated seat 5 75#   HEP education and instruction      Ther Activity                  Gait Training                  Modalities

## 2023-10-10 ENCOUNTER — OFFICE VISIT (OUTPATIENT)
Dept: PHYSICAL THERAPY | Facility: MEDICAL CENTER | Age: 68
End: 2023-10-10
Payer: MEDICARE

## 2023-10-10 DIAGNOSIS — D48.0 NEOPLASM OF UNCERTAIN BEHAVIOR OF BONE AND ARTICULAR CARTILAGE: ICD-10-CM

## 2023-10-10 DIAGNOSIS — S86.891S MEDIAL TIBIAL STRESS SYNDROME, RIGHT, SEQUELA: Primary | ICD-10-CM

## 2023-10-10 PROCEDURE — 97140 MANUAL THERAPY 1/> REGIONS: CPT

## 2023-10-10 PROCEDURE — 97110 THERAPEUTIC EXERCISES: CPT

## 2023-10-10 NOTE — PROGRESS NOTES
Daily Note     Today's date: 10/10/2023  Patient name: Jennifer Obrien  : 1955  MRN: [id number]  Referring provider: Suha Benítez, ELDON  Dx:   Encounter Diagnosis     ICD-10-CM    1. Medial tibial stress syndrome, right, sequela  S86.891S       2. 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 She was raking leaves yesterday, 2 rounds of 20 min. No pain while she was raking, but onset after the second round. It has decreased in intensity since yesterday. Objective: See treatment diary below      Assessment: Tolerated treatment well. Reviewed Neil Devine has not been doing her gasroc stretching. Minimal restrictions noted with roller to quad/gastroc, but she demonstrated restriction into DF. Good ankle control with single leg balance, minimal UE support required. Generalized fatigue noted with PREs. Continued PT would be beneficial to improve function.           Plan: Continue per plan of care.        Precautions: s/p R tibial bone biopsy (23), osteoporosis, hx of R tibial stress fracture , hx of R distal tib/fib stress fracture     *Potential adhesive allergy        Manuals 9/28 10/3 10/5 10/10    R ankle PROM        R ankle concentrics (AROM with manual resistance) KEAGAN THOMPSON    STM to R gastroc and R  Quad with  roller KEAGAN THOM[facility]ON            Neuro Re-Ed                        Ther Ex        Rec bike 5' 5' 5' 5'    Strap gastroc stretch HEP HEP      Ankle tband 4-way x20 ea blue HEP      Prone quad stretch 30"x4 HEP      Standing SL Heel raises 5"  x10   5" 2x10 5"x30 5"x30    Toe raises -  Stand x20  R 5"x30 stand 5"x30 5"x30    BAPS board x20 ea 4-way   L3   np      SLS x10 to fatigue  AE x10 to fatigue AE x10 to fatigue AE x10 to fatigue AE    Wall ball squats x30 x30 x30 x30    Leg press  3x10 DL seated seat 5 70# 3x10 DL seated seat 5 75# 3x10 DL seated seat 5 75# HEP education and instruction        Ther Activity                        Gait Training                        Modalities

## 2023-10-12 ENCOUNTER — OFFICE VISIT (OUTPATIENT)
Dept: PHYSICAL THERAPY | Facility: MEDICAL CENTER | Age: 68
End: 2023-10-12
Payer: MEDICARE

## 2023-10-12 DIAGNOSIS — S86.891S MEDIAL TIBIAL STRESS SYNDROME, RIGHT, SEQUELA: Primary | ICD-10-CM

## 2023-10-12 DIAGNOSIS — D48.0 NEOPLASM OF UNCERTAIN BEHAVIOR OF BONE AND ARTICULAR CARTILAGE: ICD-10-CM

## 2023-10-12 PROCEDURE — 97110 THERAPEUTIC EXERCISES: CPT | Performed by: PHYSICAL THERAPIST

## 2023-10-12 PROCEDURE — 97140 MANUAL THERAPY 1/> REGIONS: CPT | Performed by: PHYSICAL THERAPIST

## 2023-10-12 NOTE — PROGRESS NOTES
Daily Note     Today's date: 10/12/2023  Patient name: Brandie Angel  : 1955  MRN: [id number]  Referring provider: Vanessa Gomez PA-C  Dx:   Encounter Diagnosis     ICD-10-CM    1. Medial tibial stress syndrome, right, sequela  S86.891S       2. 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 She did not have any soreness after last session. Objective: See treatment diary below      Assessment: Performed stick rolling STM to R quad/gastroc to address mild remaining soft tissue restrictions and to reduce stress on R anterior ankle when ambulating. Soft tissue restrictions continue to be reduced each session. Patient demonstrates the most difficulty performing INV/EV against manual resistance, however, multiplanar ankle strength continues to improve overall over the last few weeks. Patient was educated to continue to perform gastroc flexibility HEP consistently to prevent recurrence of stiffness. Patient tolerated treatment well and completed program without pain. Patient would benefit from continued PT to further progress strengthening to be able to ambulate longer distances. Plan: Continue per plan of care.       Precautions: s/p R tibial bone biopsy (23), osteoporosis, hx of R tibial stress fracture , hx of R distal tib/fib stress fracture     *Potential adhesive allergy        Manuals 9/28 10/3 10/5 10/10 10/12   R ankle PROM        R ankle concentrics (AROM with manual resistance) KO KP KP MB KP   STM to R gastroc and R  Quad with  roller KO KP KP MB KP           Neuro Re-Ed                        Ther Ex        Rec bike 5' 5' 5' 5' 5'   Strap gastroc stretch HEP HEP      Ankle tband 4-way x20 ea blue HEP      Prone quad stretch 30"x4 HEP      Standing SL Heel raises 5"  x10   5" 2x10 5"x30 5"x30 5"x30 DL, 2x10 SL   Toe raises -  Stand x20  R 5"x30 stand 5"x30 5"x30 5"x30   BAPS board x20 ea 4-way   L3   np      SLS x10 to fatigue  AE x10 to fatigue AE x10 to fatigue AE x10 to fatigue AE X10 to fatigue AE   Wall ball squats x30 x30 x30 x30 x30   Leg press  3x10 DL seated seat 5 70# 3x10 DL seated seat 5 75# 3x10 DL seated seat 5 75# 3x10 DL seated seat 5 75#   HEP education and instruction        Ther Activity                        Gait Training                        Modalities

## 2023-10-17 ENCOUNTER — APPOINTMENT (OUTPATIENT)
Dept: PHYSICAL THERAPY | Facility: MEDICAL CENTER | Age: 68
End: 2023-10-17
Payer: MEDICARE

## 2023-10-20 ENCOUNTER — OFFICE VISIT (OUTPATIENT)
Dept: PHYSICAL THERAPY | Facility: MEDICAL CENTER | Age: 68
End: 2023-10-20
Payer: MEDICARE

## 2023-10-20 DIAGNOSIS — S86.891S MEDIAL TIBIAL STRESS SYNDROME, RIGHT, SEQUELA: Primary | ICD-10-CM

## 2023-10-20 DIAGNOSIS — D48.0 NEOPLASM OF UNCERTAIN BEHAVIOR OF BONE AND ARTICULAR CARTILAGE: ICD-10-CM

## 2023-10-20 PROCEDURE — 97140 MANUAL THERAPY 1/> REGIONS: CPT | Performed by: PHYSICAL THERAPIST

## 2023-10-20 PROCEDURE — 97110 THERAPEUTIC EXERCISES: CPT | Performed by: PHYSICAL THERAPIST

## 2023-10-20 NOTE — PROGRESS NOTES
Daily Note     Today's date: 10/20/2023  Patient name: Jose M Michele  : 1955  MRN: [id number]  Referring provider: Kaity Gutierrez PA-C  Dx:   Encounter Diagnosis     ICD-10-CM    1. Medial tibial stress syndrome, right, sequela  S86.891S       2. 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 She was able to do more walking and stair climbing at the beach over the past weekend. This will be her last visit until 23 due to traveling out of town. Objective: See treatment diary below      Assessment: Continued with  STM to R quad/gastroc to address mild remaining soft tissue restrictions and to reduce stress on R anterior ankle when ambulating. Also continued with concentric manual ankle strengthening with patient consistently demonstrating improved multiplanar ankle strength during this intervention each session. Able to initiate resistance for wall ball squats and progress resistance for leg press, which further demonstrates good progress toward goals. Patient tolerated treatment well, demonstrated appropriate fatigue post-tx, and completed program without pain. Patient would benefit from continued PT to maximize independence with HEP prior to upcoming d/c.      Plan: Continue per plan of care. Potential discharge next visit.      Precautions: s/p R tibial bone biopsy (23), osteoporosis, hx of R tibial stress fracture , hx of R distal tib/fib stress fracture     *Potential adhesive allergy        Manuals 9/28 10/3 10/5 10/10 10/12 10/20   R ankle PROM         R ankle concentrics (AROM with manual resistance) KO KP KP MB KP KP   STM to R gastroc and R  Quad with  roller KO  KP MB KP KP            Neuro Re-Ed                           Ther Ex         Rec bike 5' 5' 5' 5' 5' 5'   Strap gastroc stretch HEP HEP       Ankle tband 4-way x20 ea blue HEP       Prone quad stretch 30"x4 HEP       Standing SL Heel raises 5"  x10   5" 2x10 5"x30 5"x30 5"x30 DL, 2x10 SL 5"x30 SL   Toe raises -  Stand x20  R 5"x30 stand 5"x30 5"x30 5"x30 5"x30   BAPS board x20 ea 4-way   L3   np       SLS x10 to fatigue  AE x10 to fatigue AE x10 to fatigue AE x10 to fatigue AE X10 to fatigue AE X10 to fatigue AE   Wall ball squats x30 x30 x30 x30 x30 X25 5#   Leg press  3x10 DL seated seat 5 70# 3x10 DL seated seat 5 75# 3x10 DL seated seat 5 75# 3x10 DL seated seat 5 75# 3x10 DL seated seat 5 80#   HEP education and instruction         Ther Activity                           Gait Training                           Modalities

## 2023-10-24 ENCOUNTER — APPOINTMENT (OUTPATIENT)
Dept: PHYSICAL THERAPY | Facility: MEDICAL CENTER | Age: 68
End: 2023-10-24
Payer: MEDICARE

## 2023-10-26 ENCOUNTER — APPOINTMENT (OUTPATIENT)
Dept: PHYSICAL THERAPY | Facility: MEDICAL CENTER | Age: 68
End: 2023-10-26
Payer: MEDICARE

## 2023-11-09 ENCOUNTER — EVALUATION (OUTPATIENT)
Dept: PHYSICAL THERAPY | Facility: MEDICAL CENTER | Age: 68
End: 2023-11-09
Payer: MEDICARE

## 2023-11-09 DIAGNOSIS — D48.0 NEOPLASM OF UNCERTAIN BEHAVIOR OF BONE AND ARTICULAR CARTILAGE: ICD-10-CM

## 2023-11-09 DIAGNOSIS — S86.891S MEDIAL TIBIAL STRESS SYNDROME, RIGHT, SEQUELA: Primary | ICD-10-CM

## 2023-11-09 PROCEDURE — 97140 MANUAL THERAPY 1/> REGIONS: CPT | Performed by: PHYSICAL THERAPIST

## 2023-11-09 PROCEDURE — 97110 THERAPEUTIC EXERCISES: CPT | Performed by: PHYSICAL THERAPIST

## 2023-11-09 NOTE — PROGRESS NOTES
PT Re-Evaluation     Today's date: 2023  Patient name: Rohith Allen  : 1955  MRN: [id number]  Referring provider: Tommy Naranjo PA-C  Dx:   Encounter Diagnosis     ICD-10-CM    1. Medial tibial stress syndrome, right, sequela  S86.891S       2. 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 She notes that she has progressed her walking duration to approx. 20 min, which is an improvement compared to previously. She notes that although she is able to walk longer, she continues to have some fatigue in her R calf/shin. She is pleased with her overall progress and would like to continue PT to work on her strengthening to be able to walk longer distances. Objective: See treatment diary below      Ankle AROM and PROM:   WFL and symmetrical bilaterally all planes    Ankle strength:  L: 5/5 all planes  R: 4/5 all planes    DL HR: able to perform 30 reps bilaterally with good symmetry (fatigue in R gastroc)  DL TR: able to perform 20 reps bilaterally with good symmetry (fatigue in R gastroc)    SLS:  L: 10s, R: 5s (increased ankle sway and lateral trunk lean)    Mild soft tissue restrictions in R quad, mild soft tissue restrictions in R gastroc    Assessment: Patient has demonstrated good overall progress over the last few weeks with improving her R ankle DF AROM, which has improved her quality of gait mechanics for shorter distance ambulation. Although improved, patient continues to demonstrate multiplanar strength deficits in her R ankle when compared to the contralateral side. This limits her ability to ambulate long community distances to her prior capacity.  Balance dysfunction on her R LE and global closed kinetic chain strength deficits throughout her R LE when compared to the contralateral side further limit her functional activity tolerance. Continued with manual interventions for ankle strengthening and to address mild remaining soft tissue restrictions in R quad/gastroc to further reduce compensatory stress on R ankle when ambulating. Also returned to ankle strengthening program as outlined below. Patient was given tband of increased resistance for 4-way ankle strengthening during HEP, and she was educated to add SLS/SL HR/TR to HEP to further improve R LE strength. Decreased intensity for CKC strengthening program today to prevent overload due to this being patient's first visit since 10/20 since traveling. Patient is demonstrating steady progress toward her goals. She tolerated treatment well today and completed program in a pain-free range. Patient would benefit from continued PT to further improve R LE strength to be able to ambulate long community distances to her prior capacity. Goals  STG:  Patient will be independent with home exercise program.- met   Patient will demonstrate decreased swelling in R anterior ankle to improve mobility for ambulation.- met  LTG:  Patient will increase R ankle DF AROM to at least 5-10 deg to improve quality of gait mechanics. - met  Patient will increase R ankle strength to at least 4+/5 in all planes to be able to stand for longer periods. - in progress (improved)  Patient will be able to ambulate longer distances in the community.- in progress  Patient will be able to participate in tennis with modifications as necessary.- in progress  Patient will be able to manage symptoms independently. - in progress     Plan: 1x/week for 1 more week. Then, follow-up in 2 weeks after that visit for re-assessment and strengthening progressions.      Precautions: s/p R tibial bone biopsy (5/8/23), osteoporosis, hx of R tibial stress fracture 2015, hx of R distal tib/fib stress fracture 2018    *Potential adhesive allergy        Manuals 9/28 10/3 10/5 10/10 10/12 10/20 11/9   R ankle PROM          R ankle concentrics (AROM with manual resistance) KO KP KP MB KP KP KP   STM to R gastroc and R  Quad with  roller KO KP KP MB KP KP KP   Re-Evaluation       KP   Neuro Re-Ed                              Ther Ex          Rec bike 5' 5' 5' 5' 5' 5' 5'   Strap gastroc stretch HEP HEP        Ankle tband 4-way x20 ea blue HEP     X20 ea black   Prone quad stretch 30"x4 HEP        Standing SL Heel raises 5"  x10   5" 2x10 5"x30 5"x30 5"x30 DL, 2x10 SL 5"x30 SL 5"x23 SL   Toe raises -  Stand x20  R 5"x30 stand 5"x30 5"x30 5"x30 5"x30 5"x20   BAPS board x20 ea 4-way   L3   np        SLS x10 to fatigue  AE x10 to fatigue AE x10 to fatigue AE x10 to fatigue AE X10 to fatigue AE X10 to fatigue AE X5 to fatigue AE   Wall ball squats x30 x30 x30 x30 x30 X25 5# X30 no weight   Leg press  3x10 DL seated seat 5 70# 3x10 DL seated seat 5 75# 3x10 DL seated seat 5 75# 3x10 DL seated seat 5 75# 3x10 DL seated seat 5 80# 3x10 DL seated seat 5 70#   HEP education and instruction       x5'   Ther Activity                              Gait Training                              Modalities

## 2023-11-17 ENCOUNTER — OFFICE VISIT (OUTPATIENT)
Dept: PHYSICAL THERAPY | Facility: MEDICAL CENTER | Age: 68
End: 2023-11-17
Payer: MEDICARE

## 2023-11-17 DIAGNOSIS — D48.0 NEOPLASM OF UNCERTAIN BEHAVIOR OF BONE AND ARTICULAR CARTILAGE: ICD-10-CM

## 2023-11-17 DIAGNOSIS — S86.891S MEDIAL TIBIAL STRESS SYNDROME, RIGHT, SEQUELA: Primary | ICD-10-CM

## 2023-11-17 PROCEDURE — 97110 THERAPEUTIC EXERCISES: CPT | Performed by: PHYSICAL THERAPIST

## 2023-11-17 PROCEDURE — 97140 MANUAL THERAPY 1/> REGIONS: CPT | Performed by: PHYSICAL THERAPIST

## 2023-11-17 NOTE — PROGRESS NOTES
Daily Note     Today's date: 2023  Patient name: Maddi Blanco  : 1955  MRN: [id number]  Referring provider: Josie Franco PA-C  Dx:   Encounter Diagnosis     ICD-10-CM    1. Medial tibial stress syndrome, right, sequela  S86.891S       2. 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 She did wake up with some discomfort in her R ankle yesterday morning, but this discomfort subsided shortly. She notes that she was able to also resume walking this week and developed some soreness in her shin today after a 10 minute walk. Objective: See treatment diary below      Assessment: Continued with STM to R quad/gastroc to address mild remaining soft tissue restrictions and to decrease stress on R anterior compartment when ambulating. Also continued with manually resisted concentric ankle strengthening with cueing provided to prevent compensation from knee and hip during resisted INV/EV. Decreased reps for SLS due to report of fatigue in R anterior shin musculature. Also held toe raises and SL heel raises due to baseline soreness. Performed gentle BAPS board to improve multiplanar mobility and reduce fatigue and soreness. Patient tolerated modified session well. Patient is demonstrating overall steady progress towards her goals, and she is gaining independence with her program. She was educated to rest from performing CKC strengthening during her HEP for at least 1 day, and was educated to resume strengthening as tolerated. Patient was educated to perform prone quad stretch and strap gastroc stretch prior to walking as a warm-up. Patient would benefit from continued PT to further progress ankle strengthening to be able to ambulate longer distances. Plan: Continue per plan of care.  Follow-up in 2 weeks for re-assessment.      Precautions: s/p R tibial bone biopsy (5/8/23), osteoporosis, hx of R tibial stress fracture 2015, hx of R distal tib/fib stress fracture 2018    *Potential adhesive allergy        Manuals 9/28 10/3 10/5 10/10 10/12 10/20 11/9 11/17   R ankle PROM           R ankle concentrics (AROM with manual resistance) KO KP KP MB KP [facility]   STM to R gastroc and R  Quad with  roller KO KP KP MB [facility]   Re-Evaluation           Neuro Re-Ed                                 Ther Ex           Rec bike 5' 5' 5' 5' 5' 5' 5' 5'   Strap gastroc stretch HEP HEP         Ankle tband 4-way x20 ea blue HEP     X20 ea black HEP   Prone quad stretch 30"x4 HEP         Standing SL Heel raises 5"  x10   5" 2x10 5"x30 5"x30 5"x30 DL, 2x10 SL 5"x30 SL 5"x23 SL held   Toe raises -  Stand x20  R 5"x30 stand 5"x30 5"x30 5"x30 5"x30 5"x20 held   BAPS board x20 ea 4-way   L3   np      X30 ea 4-way L2   SLS x10 to fatigue  AE x10 to fatigue AE x10 to fatigue AE x10 to fatigue AE X10 to fatigue AE X10 to fatigue AE X5 to fatigue AE X3 to fatigue AE   Wall ball squats x30 x30 x30 x30 x30 X25 5# X30 no weight held   Leg press  3x10 DL seated seat 5 70# 3x10 DL seated seat 5 75# 3x10 DL seated seat 5 75# 3x10 DL seated seat 5 75# 3x10 DL seated seat 5 80# 3x10 DL seated seat 5 70# held   HEP education and instruction       x5'    Ther Activity                                 Gait Training                                 Modalities

## 2023-11-30 ENCOUNTER — OFFICE VISIT (OUTPATIENT)
Dept: PHYSICAL THERAPY | Facility: MEDICAL CENTER | Age: 68
End: 2023-11-30
Payer: MEDICARE

## 2023-11-30 DIAGNOSIS — S86.891S MEDIAL TIBIAL STRESS SYNDROME, RIGHT, SEQUELA: Primary | ICD-10-CM

## 2023-11-30 DIAGNOSIS — D48.0 NEOPLASM OF UNCERTAIN BEHAVIOR OF BONE AND ARTICULAR CARTILAGE: ICD-10-CM

## 2023-11-30 PROCEDURE — 97110 THERAPEUTIC EXERCISES: CPT | Performed by: PHYSICAL THERAPIST

## 2023-11-30 PROCEDURE — 97140 MANUAL THERAPY 1/> REGIONS: CPT | Performed by: PHYSICAL THERAPIST

## 2023-11-30 NOTE — PROGRESS NOTES
Daily Note     Today's date: 2023  Patient name: Janna Carlson  : 1955  MRN: [id number]  Referring provider: Tico Chinchilla PA-C  Dx:   Encounter Diagnosis     ICD-10-CM    1. Medial tibial stress syndrome, right, sequela  S86.891S       2. 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 She reports that she has returned to swimming and biking more consistently since her last visit. She reports that she was standing for a long period of time when cooking last week, and she developed a lot of pain in the front of her shin. She attempted to walk the following day, and she developed pain again. The pain in her shin subsides with rest. Although she is overall able to sustain more standing and walking since beginning PT, the pain continues to return after longer periods of walking/standing. Objective: See treatment diary below      Assessment: Focused session today on addressing soft tissue restrictions in anterior and posterior ankle compartments. Patient responded well to [facility] with report of decreased stiffness post-tx. Held all OKC and CKC strengthening interventions today due to report of baseline soreness and recurrence of symptoms when ambulating. Patient was educated in an updated illustrated HEP with a focus on gastroc stretching at an increased frequency to reduce stress on R anterior shin. Patient tolerated modified session well and completed program in a pain-free range. Patient was educated to hold on strengthening HEP to allow symptoms to subside. Plan to assess response to reducing HEP intensity next session. Patient would benefit from continued PT to improve ankle mobility and strength to be able to ambulate longer distances. Plan: Progress note during next visit. Plan to evaluate R shoulder pain next visit. Precautions: s/p R tibial bone biopsy (5/8/23), osteoporosis, hx of R tibial stress fracture 2015, hx of R distal tib/fib stress fracture 2018    *Potential adhesive allergy        Manuals 9/28 10/3 10/5 10/10 10/12 10/20 11/9 11/17 11/30   R ankle PROM            R ankle concentrics (AROM with manual resistance) KO KP KP MB KP KP KP KP    STM to R gastroc and R  Quad with  roller KO KP KP MB KP KP KP KP    STM to R anterior tibialis/R gastroc         KP   Re-Evaluation            Neuro Re-Ed                                    Ther Ex            Rec bike 5' 5' 5' 5' 5' 5' 5' 5' 5'   Strap gastroc stretch HEP HEP       30"x4   Wall gastroc stretch         30"x3   Ankle tband 4-way x20 ea blue HEP     X20 ea black HEP held   Prone quad stretch 30"x4 HEP          Standing SL Heel raises 5"  x10   5" 2x10 5"x30 5"x30 5"x30 DL, 2x10 SL 5"x30 SL 5"x23 SL held held   Toe raises -  Stand x20  R 5"x30 stand 5"x30 5"x30 5"x30 5"x30 5"x20 held held   [employer] x20 ea 4-way   L3   np      X30 ea 4-way L2 held   SLS x10 to fatigue  AE x10 to fatigue AE x10 to fatigue AE x10 to fatigue AE X10 to fatigue AE X10 to fatigue AE X5 to fatigue AE X3 to fatigue AE held   Wall ball squats x30 x30 x30 x30 x30 X25 5# X30 no weight held held   Leg press  3x10 DL seated seat 5 70# 3x10 DL seated seat 5 75# 3x10 DL seated seat 5 75# 3x10 DL seated seat 5 75# 3x10 DL seated seat 5 80# 3x10 DL seated seat 5 70# held held   HEP education and instruction       x5'  x5'   Ther Activity                                    Gait Training                                    Modalities

## 2023-12-04 ENCOUNTER — EVALUATION (OUTPATIENT)
Dept: PHYSICAL THERAPY | Facility: MEDICAL CENTER | Age: 68
End: 2023-12-04
Payer: MEDICARE

## 2023-12-04 DIAGNOSIS — S86.891S MEDIAL TIBIAL STRESS SYNDROME, RIGHT, SEQUELA: ICD-10-CM

## 2023-12-04 DIAGNOSIS — G89.29 CHRONIC RIGHT SHOULDER PAIN: Primary | ICD-10-CM

## 2023-12-04 DIAGNOSIS — M25.511 CHRONIC RIGHT SHOULDER PAIN: Primary | ICD-10-CM

## 2023-12-04 DIAGNOSIS — D48.0 NEOPLASM OF UNCERTAIN BEHAVIOR OF BONE AND ARTICULAR CARTILAGE: ICD-10-CM

## 2023-12-04 PROCEDURE — 97140 MANUAL THERAPY 1/> REGIONS: CPT | Performed by: PHYSICAL THERAPIST

## 2023-12-04 PROCEDURE — 97110 THERAPEUTIC EXERCISES: CPT | Performed by: PHYSICAL THERAPIST

## 2023-12-04 PROCEDURE — 97164 PT RE-EVAL EST PLAN CARE: CPT | Performed by: PHYSICAL THERAPIST

## 2023-12-04 NOTE — LETTER
2023    Merline Deaner., MD  [address]    Patient: Pritesh Ni   YOB: 1955   Date of Visit: 2023     Encounter Diagnosis     ICD-10-CM    1. Chronic right shoulder pain  M25.511     G89.29       2. Medial tibial stress syndrome, right, sequela  S86.891S       3. 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 Dorcas Rojas: Thank you for your recent referral of Pritesh Ni. Please review the attached evaluation summary from Silvina's recent visit. Please verify that you agree with the plan of care by signing the attached order. If you have any questions or concerns, please do not hesitate to call. I sincerely appreciate the opportunity to share in the care of one of your patients and hope to have another opportunity to work with you in the near future. Sincerely,    Ira Seip, PT      Referring Provider:      I certify that I have read the below Plan of Care and certify the need for these services furnished under this plan of treatment while under my care. Merline Deaner., MD  [address]  Via Fax: [phone]          PT Re-Evaluation     Today's date: 2023  Patient name: Pritesh Ni  : 1955  MRN: [id number]  Referring provider: Merline Deaner., MD  Dx:   Encounter Diagnosis     ICD-10-CM    1. Chronic right shoulder pain  M25.511     G89.29       2. Medial tibial stress syndrome, right, sequela  S86.891S       3. 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 She continues to be improved since her initial visit, but she continues to have pain when walking longer distances and standing for long periods of time.  She is agreeable to switch focus to her R shoulder today. She has had R shoulder pain for approx. 3 months after swimming, especially freestyle and backstroke. The pain is located in the outside/front/back of her R shoulder. No numbness/tingling present. The pain is worsened when reaching overhead and swimming. Objective: See treatment diary below      Shoulder AROM:  FLX: L: 170 deg, R: 170 deg (painful arc)  ABD: L: 175 deg, R: 175 deg  BITA BTH: L: C7, R: C7  FIR BTB: L: T9, R: T11    Shoulder strength:  FLX: L: 5/5, R: 4/5  ABD: L: 5/5, R: 4/5  ER: L: 5/5, R: 3+/5 (pain)  IR: L: 5/5, R: 4/5    Shoulder PROM:  FLX: L: 175 deg, R: 175 deg  ABD: L: 180 deg, R: 120 deg (pain)  ER at 90: L: 90 deg, R: 90 deg  IR at 90: L: 65 deg, R: 50 deg    Scapular strength:  Middle trapezius: L: 4+/5, R: 3+/5  Lower trapezius: L: 4/5, R: 3+/5    Special tests:  (+) [place]-George on R  (-) Drop arm on R  (+) Scapular relocation test on R for increased strength  (+) Scapular assistance test on R for decreased pain      Ankle AROM and PROM:   WFL and symmetrical bilaterally all planes    Ankle strength:  L: 5/5 all planes  R: 4/5 all planes      SLS:  L: 10s, R: 5s (increased ankle sway and lateral trunk lean)        Assessment: Patient has demonstrated good progress with reducing the swelling in her R anterior ankle and improving her R ankle AROM in all planes over the past few months, which has improved the quality of her gait mechanics for ambulation. Patient has also demonstrated steady progress with improving her R ankle strength since her IE, which has allowed her increase her distance of shorter distance ambulation. Although improved, patient continues to demonstrate strength deficits in her R LE compared to the contralateral side. This contributes to limitations in her ability to ambulate long community distances to her prior capacity. Patient is independent in an illustrated HEP for continued ankle flexibility.  Due to recurrence of symptoms after longer distance ambulation and standing earlier last week, patient was educated to focus on flexibility HEP for her ankle at this time until soreness subsides. Plan to progress strengthening as able. Due to overall progress with R ankle and independence with HEP, evaluated chronic R shoulder pain today. Primary movement impairment is weakness of her R scapular stabilizers, which contributes to compensatory stress on her R shoulder girdle and limits her ability to reach overhead, swim, and play tennis. Patient also presents with R posterior capsule tightness, which further causes excessive strain on R subacromial structures when performing ADL and recreation. Patient responded well to R GH posterior capsule stretching with improvements in R shoulder FIR AROM BTB post-tx. She was educated in an illustrated HEP for scapular stability and shoulder mobility and was able to complete exercises without pain. Patient would benefit from continued PT to address impairments to maximize function. Goals  STG:  Patient will be independent with home exercise program.- met   Patient will demonstrate decreased swelling in R anterior ankle to improve mobility for ambulation.- met  LTG:  Patient will increase R ankle DF AROM to at least 5-10 deg to improve quality of gait mechanics. - met  Patient will increase R ankle strength to at least 4+/5 in all planes to be able to stand for longer periods. - in progress (improved)  Patient will be able to ambulate longer distances in the community.- in progress  Patient will increase R shoulder FIR BTB to be comparable to the contralateral side to improve swimming mechanics. - in progress  Patient will increase strength of R scapular stabilizers by at least 1-2 grades via MMT to reduce stress on R shoulder girdle during ADL. - in progress  Patient will be able to participate in tennis with modifications as necessary.- in progress  Patient will be able to return to swimming.- in progress  Patient will be able to manage symptoms independently. - in progress     Plan: 2x/week tapering to 1x/week for 6 more weeks. Plan to focus each session on current source of primary limitation.      Precautions: s/p R tibial bone biopsy (5/8/23), osteoporosis, hx of R tibial stress fracture 2015, hx of R distal tib/fib stress fracture 2018    *Potential adhesive allergy        Manuals 10/20 11/9 11/17 11/30 12/4   R ankle concentrics (AROM with manual resistance) KP  KP     STM to R gastroc and R  Quad with  roller KP KP KP     STM to R anterior tibialis/R gastroc    KP    R shoulder IR PROM     KP   Re-Evaluation  [facility]   Neuro Re-Ed                        Ther Ex        Rec bike 5' 5' 5' 5'    Table slides     5"x20 FLX HEP   Supine self-shoulder FLX AAROM     NV   Pulleys     NV   Scap 4     2x10 ea HEP   Strap gastroc stretch    30"x4 HEP   Wall gastroc stretch    30"x3 HEP   Ankle tband 4-way  X20 ea black HEP held    Standing SL Heel raises 5"x30 SL 5"x23 SL held held    Toe raises -  Stand 5"x30 5"x20 held held    [employer]   X30 ea 4-way L2 held    SLS X10 to fatigue AE X5 to fatigue AE X3 to fatigue AE held    Wall ball squats X25 5# X30 no weight held held    Leg press 3x10 DL seated seat 5 80# 3x10 DL seated seat 5 70# held held    HEP education and instruction  x5'  x5' x15'   Ther Activity                        Gait Training                        Modalities        MHP R shoulder     X10' post tx

## 2023-12-04 NOTE — PROGRESS NOTES
PT Re-Evaluation     Today's date: 2023  Patient name: Betzy Leonard  : 1955  MRN: [id number]  Referring provider: Christina Sam MD  Dx:   Encounter Diagnosis     ICD-10-CM    1. Chronic right shoulder pain  M25.511     G89.29       2. Medial tibial stress syndrome, right, sequela  S86.891S       3. 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 She continues to be improved since her initial visit, but she continues to have pain when walking longer distances and standing for long periods of time. She is agreeable to switch focus to her R shoulder today. She has had R shoulder pain for approx. 3 months after swimming, especially freestyle and backstroke. The pain is located in the outside/front/back of her R shoulder. No numbness/tingling present. The pain is worsened when reaching overhead and swimming.        Objective: See treatment diary below      Shoulder AROM:  FLX: L: 170 deg, R: 170 deg (painful arc)  ABD: L: 175 deg, R: 175 deg  BITA BTH: L: C7, R: C7  FIR BTB: L: T9, R: T11    Shoulder strength:  FLX: L: 5/5, R: 4/5  ABD: L: 5/5, R: 4/5  ER: L: 5/5, R: 3+/5 (pain)  IR: L: 5/5, R: 4/5    Shoulder PROM:  FLX: L: 175 deg, R: 175 deg  ABD: L: 180 deg, R: 120 deg (pain)  ER at 90: L: 90 deg, R: 90 deg  IR at 90: L: 65 deg, R: 50 deg    Scapular strength:  Middle trapezius: L: 4+/5, R: 3+/5  Lower trapezius: L: 4/5, R: 3+/5    Special tests:  (+) [place]-George on R  (-) Drop arm on R  (+) Scapular relocation test on R for increased strength  (+) Scapular assistance test on R for decreased pain      Ankle AROM and PROM:   WFL and symmetrical bilaterally all planes    Ankle strength:  L: 5/5 all planes  R: 4/5 all planes      SLS:  L: 10s, R: 5s (increased ankle sway and lateral trunk lean)        Assessment: Patient has demonstrated good progress with reducing the swelling in her R anterior ankle and improving her R ankle AROM in all planes over the past few months, which has improved the quality of her gait mechanics for ambulation. Patient has also demonstrated steady progress with improving her R ankle strength since her IE, which has allowed her increase her distance of shorter distance ambulation. Although improved, patient continues to demonstrate strength deficits in her R LE compared to the contralateral side. This contributes to limitations in her ability to ambulate long community distances to her prior capacity. Patient is independent in an illustrated HEP for continued ankle flexibility. Due to recurrence of symptoms after longer distance ambulation and standing earlier last week, patient was educated to focus on flexibility HEP for her ankle at this time until soreness subsides. Plan to progress strengthening as able. Due to overall progress with R ankle and independence with HEP, evaluated chronic R shoulder pain today. Primary movement impairment is weakness of her R scapular stabilizers, which contributes to compensatory stress on her R shoulder girdle and limits her ability to reach overhead, swim, and play tennis. Patient also presents with R posterior capsule tightness, which further causes excessive strain on R subacromial structures when performing ADL and recreation. Patient responded well to R GH posterior capsule stretching with improvements in R shoulder FIR AROM BTB post-tx. She was educated in an illustrated HEP for scapular stability and shoulder mobility and was able to complete exercises without pain. Patient would benefit from continued PT to address impairments to maximize function.     Goals  STG:  Patient will be independent with home exercise program.- met   Patient will demonstrate decreased swelling in R anterior ankle to improve mobility for ambulation.- met  LTG:  Patient will increase R ankle DF AROM to at least 5-10 deg to improve quality of gait mechanics. - met  Patient will increase R ankle strength to at least 4+/5 in all planes to be able to stand for longer periods. - in progress (improved)  Patient will be able to ambulate longer distances in the community.- in progress  Patient will increase R shoulder FIR BTB to be comparable to the contralateral side to improve swimming mechanics. - in progress  Patient will increase strength of R scapular stabilizers by at least 1-2 grades via MMT to reduce stress on R shoulder girdle during ADL. - in progress  Patient will be able to participate in tennis with modifications as necessary.- in progress  Patient will be able to return to swimming.- in progress  Patient will be able to manage symptoms independently. - in progress     Plan: 2x/week tapering to 1x/week for 6 more weeks. Plan to focus each session on current source of primary limitation.      Precautions: s/p R tibial bone biopsy (5/8/23), osteoporosis, hx of R tibial stress fracture 2015, hx of R distal tib/fib stress fracture 2018    *Potential adhesive allergy        Manuals 10/20 11/9 11/17 11/30 12/4   R ankle concentrics (AROM with manual resistance) KP KP KP     STM to R gastroc and R  Quad with  roller KP KP KP     STM to R anterior tibialis/R gastroc    KP    R shoulder IR PROM     KP   Re-Evaluation  KP   KP   Neuro Re-Ed                        Ther Ex        Rec bike 5' 5' 5' 5'    Table slides     5"x20 FLX HEP   Supine self-shoulder FLX AAROM     NV   Pulleys     NV   Scap 4     2x10 ea HEP   Strap gastroc stretch    30"x4 HEP   Wall gastroc stretch    30"x3 HEP   Ankle tband 4-way  X20 ea black HEP held    Standing SL Heel raises 5"x30 SL 5"x23 SL held held    Toe raises -  Stand 5"x30 5"x20 held held    [employer]   X30 ea 4-way L2 held    SLS X10 to fatigue AE X5 to fatigue AE X3 to fatigue AE held    Wall ball squats X25 5# X30 no weight held held    Leg press 3x10 DL seated seat 5 80# 3x10 DL seated seat 5 70# held held    HEP education and instruction  x5'  x5' x15'   Ther Activity                        Gait Training                        Modalities        MHP R shoulder     X10' post tx

## 2023-12-07 ENCOUNTER — OFFICE VISIT (OUTPATIENT)
Dept: PHYSICAL THERAPY | Facility: MEDICAL CENTER | Age: 68
End: 2023-12-07
Payer: MEDICARE

## 2023-12-07 DIAGNOSIS — D48.0 NEOPLASM OF UNCERTAIN BEHAVIOR OF BONE AND ARTICULAR CARTILAGE: ICD-10-CM

## 2023-12-07 DIAGNOSIS — S86.891S MEDIAL TIBIAL STRESS SYNDROME, RIGHT, SEQUELA: ICD-10-CM

## 2023-12-07 DIAGNOSIS — M25.511 CHRONIC RIGHT SHOULDER PAIN: Primary | ICD-10-CM

## 2023-12-07 DIAGNOSIS — G89.29 CHRONIC RIGHT SHOULDER PAIN: Primary | ICD-10-CM

## 2023-12-07 PROCEDURE — 97140 MANUAL THERAPY 1/> REGIONS: CPT | Performed by: PHYSICAL THERAPIST

## 2023-12-07 PROCEDURE — 97110 THERAPEUTIC EXERCISES: CPT | Performed by: PHYSICAL THERAPIST

## 2023-12-07 NOTE — PROGRESS NOTES
Daily Note     Today's date: 2023  Patient name: Osmin Mitchell  : 1955  MRN: [id number]  Referring provider: Mercedes Carreon MD  Dx:   Encounter Diagnosis     ICD-10-CM    1. Chronic right shoulder pain  M25.511     G89.29       2. Medial tibial stress syndrome, right, sequela  S86.891S       3. 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 She notes that it was easier to put her shirt on this morning. She also reports that she has been having less pain in her ankle since focusing on stretching. Objective: See treatment diary below      Assessment: Focused session on R shoulder due to R shoulder being current primary source of functional limitation. Patient continues to demonstrate mild limitations in R shoulder FIR AROM BTB compared to the contralateral side, but this has improved today compared to last session. Performed manual interventions to address limitations in end-range R shoulder FLX and IR PROM with patient demonstrating improvements in PROM in all planes post-tx. Initiated shoulder girdle mobility/scapular stability program in a pain-free range. Cueing provided during scapular retraction + ER to prevent lumbar EXT compensation. Patient tolerated treatment well and would benefit from continued PT to further improve shoulder mobility and strength to maximize function. Plan: Continue per POC. Focus each session on current primary source of functional limitation.      Precautions: s/p R tibial bone biopsy (23), osteoporosis, hx of R tibial stress fracture , hx of R distal tib/fib stress fracture     *Potential adhesive allergy        Manuals 10/20 11/9 11/17 11/30 12/4 12/7   R ankle concentrics (AROM with manual resistance) KP KP KP      STM to R gastroc and R  Quad with  roller KP KP KP      STM to R anterior tibialis/R gastroc    KP     R shoulder IR PROM     KP KP + FLX   Re-Evaluation  KP   KP    Neuro Re-Ed                           Ther Ex         Rec bike 5' 5' 5' 5'     Table slides     5"x20 FLX HEP 5"x20 FLX   Supine self-shoulder FLX AAROM     NV 5"x20   Pulleys     NV 5'   Scap 4     2x10 ea HEP 2x10 ea   Strap gastroc stretch    30"x4 HEP    Wall gastroc stretch    30"x3 HEP    Ankle tband 4-way  X20 ea black HEP held     Standing SL Heel raises 5"x30 SL 5"x23 SL held held     Toe raises -  Stand 5"x30 5"x20 held held     [employer]   X30 ea 4-way L2 held     SLS X10 to fatigue AE X5 to fatigue AE X3 to fatigue AE held     Wall ball squats X25 5# X30 no weight held held     Leg press 3x10 DL seated seat 5 80# 3x10 DL seated seat 5 70# held held     HEP education and instruction  x5'  x5' x15'    Ther Activity                           Gait Training                           Modalities         MHP R shoulder     X10' post tx X15' pre tx

## 2023-12-14 ENCOUNTER — OFFICE VISIT (OUTPATIENT)
Dept: PHYSICAL THERAPY | Facility: MEDICAL CENTER | Age: 68
End: 2023-12-14
Payer: MEDICARE

## 2023-12-14 DIAGNOSIS — D48.0 NEOPLASM OF UNCERTAIN BEHAVIOR OF BONE AND ARTICULAR CARTILAGE: ICD-10-CM

## 2023-12-14 DIAGNOSIS — G89.29 CHRONIC RIGHT SHOULDER PAIN: Primary | ICD-10-CM

## 2023-12-14 DIAGNOSIS — M25.511 CHRONIC RIGHT SHOULDER PAIN: Primary | ICD-10-CM

## 2023-12-14 DIAGNOSIS — S86.891S MEDIAL TIBIAL STRESS SYNDROME, RIGHT, SEQUELA: ICD-10-CM

## 2023-12-14 PROCEDURE — 97110 THERAPEUTIC EXERCISES: CPT | Performed by: PHYSICAL THERAPIST

## 2023-12-14 PROCEDURE — 97140 MANUAL THERAPY 1/> REGIONS: CPT | Performed by: PHYSICAL THERAPIST

## 2023-12-14 NOTE — PROGRESS NOTES
Daily Note     Today's date: 2023  Patient name: Tanisha Tapia  : 1955  MRN: [id number]  Referring provider: Lance Corona MD  Dx:   Encounter Diagnosis     ICD-10-CM    1. Chronic right shoulder pain  M25.511     G89.29       2. Medial tibial stress syndrome, right, sequela  S86.891S       3. 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 She notes that her shin has been feeling better when walking since resting from the strengthening exercises. She did have 1 episode of pain in her R shin bone at night after walking in [place], but this pain improved by the next day. Objective: See treatment diary below      Assessment: Continued to focus on R shoulder due to R shoulder being current primary source of functional limitation. Patient continues to demonstrate mild limitation in R shoulder FIR AROM BTB compared to the contralateral side but responded well to manual interventions with improvements in IR PROM post-tx. She also demonstrated improvements in FIR AROM today compared to prior sessions. Added supine serratus punches today to improve scapular stability and added supine cross body stretching to improve posterior shoulder mobility. Patient was able to initiate resistance for standing scapular strengthening, which demonstrates good progress towards goals. Patient was given tband for standing scapular strengthening during HEP performance. Patient tolerated treatment well and completed program without pain. Patient would benefit from continued PT to improve shoulder girdle mobility and scapular stability to return to active lifestyle. Plan: Continue per plan of care. Focus each session on current primary source of limitation.      Precautions: s/p R tibial bone biopsy (23), osteoporosis, hx of R tibial stress fracture 2015, hx of R distal tib/fib stress fracture 2018    *Potential adhesive allergy        Manuals 10/20 11/9 11/17 11/30 12/4 12/7 12/14   R ankle concentrics (AROM with manual resistance) KP KP KP       STM to R gastroc and R  Quad with  roller KP KP KP       STM to R anterior tibialis/R gastroc    KP      R shoulder IR PROM     KP KP + FLX KP + FLX   Re-Evaluation  KP   KP     Neuro Re-Ed                              Ther Ex          Rec bike 5' 5' 5' 5'      Table slides     5"x20 FLX HEP 5"x20 FLX 5"x20 FLX   Supine self-shoulder FLX AAROM     NV 5"x20 5"x20   Supine cross body stretch       30"x3   Supine serratus punches       2x10   Pulleys     NV 5' 5'   Scap 4     2x10 ea HEP 2x10 ea 2x10 ea YTB   Strap gastroc stretch    30"x4 HEP     Wall gastroc stretch    30"x3 HEP     Ankle tband 4-way  X20 ea black HEP held      Standing SL Heel raises 5"x30 SL 5"x23 SL held held      Toe raises -  Stand 5"x30 5"x20 held held      [employer]   X30 ea 4-way L2 held      SLS X10 to fatigue AE X5 to fatigue AE X3 to fatigue AE held      Wall ball squats X25 5# X30 no weight held held      Leg press 3x10 DL seated seat 5 80# 3x10 DL seated seat 5 70# held held      HEP education and instruction  x5'  x5' x15'     Ther Activity                              Gait Training                              Modalities          MHP R shoulder     X10' post tx X15' pre tx X10' pre tx

## 2023-12-19 ENCOUNTER — OFFICE VISIT (OUTPATIENT)
Dept: PHYSICAL THERAPY | Facility: MEDICAL CENTER | Age: 68
End: 2023-12-19
Payer: MEDICARE

## 2023-12-19 DIAGNOSIS — M25.511 CHRONIC RIGHT SHOULDER PAIN: Primary | ICD-10-CM

## 2023-12-19 DIAGNOSIS — D48.0 NEOPLASM OF UNCERTAIN BEHAVIOR OF BONE AND ARTICULAR CARTILAGE: ICD-10-CM

## 2023-12-19 DIAGNOSIS — G89.29 CHRONIC RIGHT SHOULDER PAIN: Primary | ICD-10-CM

## 2023-12-19 DIAGNOSIS — S86.891S MEDIAL TIBIAL STRESS SYNDROME, RIGHT, SEQUELA: ICD-10-CM

## 2023-12-19 PROCEDURE — 97110 THERAPEUTIC EXERCISES: CPT | Performed by: PHYSICAL THERAPIST

## 2023-12-19 PROCEDURE — 97140 MANUAL THERAPY 1/> REGIONS: CPT | Performed by: PHYSICAL THERAPIST

## 2023-12-19 NOTE — PROGRESS NOTES
Daily Note     Today's date: 2023  Patient name: Silvina Jay  : 1955  MRN: [id number]  Referring provider: Talon Rae Jr., MD  Dx:   Encounter Diagnosis     ICD-10-CM    1. Chronic right shoulder pain  M25.511     G89.29       2. Medial tibial stress syndrome, right, sequela  S86.891S       3. 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 She notes that she has not attempted swimming, but she is pleased with her overall progress. She had some soreness in her shoulder blade region after doing the bands last session and doing them again at home. She notes that her ankle has some moments of aching at times, but she has not had any significant flare-ups over the last few days.      Objective: See treatment diary below      Assessment: Continued to focus session on R shoulder due to R shoulder being current primary source of functional limitation. Patient demonstrates R shoulder AROM WFL and comparable to the contralateral side in all planes except for mild remaining limitation in R shoulder FIR AROM BTB. She responded well to manual interventions with an improvement in end-range FLX PROM along with improved IR PROM post-tx. FIR AROM BTB notably improved compared to prior sessions. Held resistance for standing scapular strengthening in the clinic and during HEP due to patient report of soreness after this intervention at last session. Patient tolerated treatment well and completed program without pain. Patient would benefit from continued PT to further improve shoulder girdle mobility and scapular stability to return to active lifestyle.      Plan: Continue per plan of care. Continue current focus on R shoulder due to R shoulder being current primary source of limitation.     Precautions: s/p R tibial bone biopsy (23),  "osteoporosis, hx of R tibial stress fracture 2015, hx of R distal tib/fib stress fracture 2018    *Potential adhesive allergy        Manuals 10/20 11/9 11/17 11/30 12/4 12/7 12/14 12/19   R ankle concentrics (AROM with manual resistance) KP KP KP        STM to R gastroc and R  Quad with  roller KP KP KP        STM to R anterior tibialis/R gastroc    KP       R shoulder IR PROM     KP KP + FLX KP + FLX KP + FLX   Re-Evaluation  KP   KP      Neuro Re-Ed                                 Ther Ex           Rec bike 5' 5' 5' 5'       Table slides     5\"x20 FLX HEP 5\"x20 FLX 5\"x20 FLX 5\"x20 scapt   Supine self-shoulder FLX AAROM     NV 5\"x20 5\"x20 10\"x20   Supine cross body stretch       30\"x3 30\"x3   Supine serratus punches       2x10 2x10   Pulleys     NV 5' 5' 5'   Scap 4     2x10 ea HEP 2x10 ea 2x10 ea YTB 2x10 ea no band   Strap gastroc stretch    30\"x4 HEP      Wall gastroc stretch    30\"x3 HEP      Ankle tband 4-way  X20 ea black HEP held       Standing SL Heel raises 5\"x30 SL 5\"x23 SL held held       Toe raises -  Stand 5\"x30 5\"x20 held held       BAPS board   X30 ea 4-way L2 held       SLS X10 to fatigue AE X5 to fatigue AE X3 to fatigue AE held       Wall ball squats X25 5# X30 no weight held held       Leg press 3x10 DL seated seat 5 80# 3x10 DL seated seat 5 70# held held       HEP education and instruction  x5'  x5' x15'      Ther Activity                                 Gait Training                                 Modalities           MHP R shoulder     X10' post tx X15' pre tx X10' pre tx X10' pre tx                                     "

## 2023-12-21 ENCOUNTER — OFFICE VISIT (OUTPATIENT)
Dept: PHYSICAL THERAPY | Facility: MEDICAL CENTER | Age: 68
End: 2023-12-21
Payer: MEDICARE

## 2023-12-21 DIAGNOSIS — D48.0 NEOPLASM OF UNCERTAIN BEHAVIOR OF BONE AND ARTICULAR CARTILAGE: ICD-10-CM

## 2023-12-21 DIAGNOSIS — S86.891S MEDIAL TIBIAL STRESS SYNDROME, RIGHT, SEQUELA: ICD-10-CM

## 2023-12-21 DIAGNOSIS — G89.29 CHRONIC RIGHT SHOULDER PAIN: Primary | ICD-10-CM

## 2023-12-21 DIAGNOSIS — M25.511 CHRONIC RIGHT SHOULDER PAIN: Primary | ICD-10-CM

## 2023-12-21 PROCEDURE — 97110 THERAPEUTIC EXERCISES: CPT | Performed by: PHYSICAL THERAPIST

## 2023-12-21 PROCEDURE — 97140 MANUAL THERAPY 1/> REGIONS: CPT | Performed by: PHYSICAL THERAPIST

## 2023-12-21 NOTE — PROGRESS NOTES
Daily Note     Today's date: 2023  Patient name: Silvina Jay  : 1955  MRN: [id number]  Referring provider: Talon Rae Jr., MD  Dx:   Encounter Diagnosis     ICD-10-CM    1. Chronic right shoulder pain  M25.511     G89.29       2. Medial tibial stress syndrome, right, sequela  S86.891S       3. 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 She notes that she had a lot of pain in her R shin last night after doing a faster walk with her grandchildren. However, this has subsided in intensity by today after doing her stretches at home.      Objective: See treatment diary below      Assessment: Continued to focus session on R shoulder due to R shoulder being current primary source of functional limitation and due to independence with ankle mobility HEP. Patient continues to demonstrate very mild limitation in R shoulder FIR AROM BTB when compared to the contralateral side, but this is steadily improving each session. She also responded well to manual interventions with an improvement in R shoulder IR PROM and end-range FLX PROM post-tx. Continued to hold on performing scapular strengthening with tband due to report of soreness after this intervention at prior sessions. Able to progress reps for standing scapular strengthening, which demonstrates good progress towards goals. Patient tolerated treatment well and completed program in a pain-free range. Patient would benefit from continued PT to further improve shoulder mobility and stability to return to active lifestyle.      Plan: Continue per plan of care. Focus each session on current primary source of functional limitation.     Precautions: s/p R tibial bone biopsy (23), osteoporosis, hx of R tibial stress fracture , hx of R distal tib/fib stress fracture 2018    *Potential adhesive allergy        Manuals  "10/20 11/9 11/17 11/30 12/4 12/7 12/14 12/19 12/21   R ankle concentrics (AROM with manual resistance) KP KP KP         STM to R gastroc and R  Quad with  roller KP KP KP         STM to R anterior tibialis/R gastroc    KP        R shoulder IR PROM     KP KP + FLX KP + FLX KP + FLX KP + FLX   Re-Evaluation  KP   KP       Neuro Re-Ed                                    Ther Ex            Rec bike 5' 5' 5' 5'        Table slides     5\"x20 FLX HEP 5\"x20 FLX 5\"x20 FLX 5\"x20 scapt 10\"x20 scapt   Supine self-shoulder FLX AAROM     NV 5\"x20 5\"x20 10\"x20 10\"x20   Supine cross body stretch       30\"x3 30\"x3 30\"x4   Supine serratus punches       2x10 2x10 2x15   Pulleys     NV 5' 5' 5' 5'   Scap 4     2x10 ea HEP 2x10 ea 2x10 ea YTB 2x10 ea no band 2x15 ea no band   Strap gastroc stretch    30\"x4 HEP       Wall gastroc stretch    30\"x3 HEP       Ankle tband 4-way  X20 ea black HEP held        Standing SL Heel raises 5\"x30 SL 5\"x23 SL held held        Toe raises -  Stand 5\"x30 5\"x20 held held        BAPS board   X30 ea 4-way L2 held        SLS X10 to fatigue AE X5 to fatigue AE X3 to fatigue AE held        Wall ball squats X25 5# X30 no weight held held        Leg press 3x10 DL seated seat 5 80# 3x10 DL seated seat 5 70# held held        HEP education and instruction  x5'  x5' x15'       Ther Activity                                    Gait Training                                    Modalities            MHP R shoulder     X10' post tx X15' pre tx X10' pre tx X10' pre tx X10' pre tx                                        "

## 2023-12-26 ENCOUNTER — OFFICE VISIT (OUTPATIENT)
Dept: PHYSICAL THERAPY | Facility: MEDICAL CENTER | Age: 68
End: 2023-12-26
Payer: MEDICARE

## 2023-12-26 DIAGNOSIS — S86.891S MEDIAL TIBIAL STRESS SYNDROME, RIGHT, SEQUELA: ICD-10-CM

## 2023-12-26 DIAGNOSIS — D48.0 NEOPLASM OF UNCERTAIN BEHAVIOR OF BONE AND ARTICULAR CARTILAGE: ICD-10-CM

## 2023-12-26 DIAGNOSIS — G89.29 CHRONIC RIGHT SHOULDER PAIN: Primary | ICD-10-CM

## 2023-12-26 DIAGNOSIS — M25.511 CHRONIC RIGHT SHOULDER PAIN: Primary | ICD-10-CM

## 2023-12-26 PROCEDURE — 97110 THERAPEUTIC EXERCISES: CPT | Performed by: PHYSICAL THERAPIST

## 2023-12-26 PROCEDURE — 97140 MANUAL THERAPY 1/> REGIONS: CPT | Performed by: PHYSICAL THERAPIST

## 2023-12-26 NOTE — PROGRESS NOTES
Daily Note     Today's date: 2023  Patient name: Silvina Jay  : 1955  MRN: [id number]  Referring provider: Talon Rae Jr., MD  Dx:   Encounter Diagnosis     ICD-10-CM    1. Chronic right shoulder pain  M25.511     G89.29       2. Medial tibial stress syndrome, right, sequela  S86.891S       3. 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 She notices some discomfort in the outside of R shoulder when she is extending her arm back during freestyle when swimming. She also reports that she has not had any significant flare-ups in her shin pain over the last few days, and the stretches and self-massage with a ball seem to be helping.       Objective: See treatment diary below      Assessment: Continued to focus on R shoulder due to R shoulder being current primary source of limitation. Very mild limitation remains in R shoulder FIR AROM BTB compared to the contralateral side, but this is steadily improving each session. She also responded well to manual interventions with an improvement in end-range FLX and IR PROM post-tx. Able to progress reps for supine serratus punches and 4-way scapular activation interventions, which demonstrates an improvement in proximal endurance. Patient tolerated treatment well and completed program without pain. Patient would benefit from continued PT to improve shoulder girdle mobility and strength to facilitate a full return to active lifestyle.      Plan: Continue per plan of care. Continue current focus on R shoulder due to R shoulder being current primary source of functional limitation.     Precautions: s/p R tibial bone biopsy (23), osteoporosis, hx of R tibial stress fracture , hx of R distal tib/fib stress fracture 2018    *Potential adhesive  "allergy        Manuals 10/20 11/9 11/17 11/30 12/4 12/7 12/14 12/19 12/21 12/26   R ankle concentrics (AROM with manual resistance) KP KP KP          STM to R gastroc and R  Quad with  roller KP KP KP          STM to R anterior tibialis/R gastroc    KP         R shoulder IR PROM     KP KP + FLX KP + FLX KP + FLX KP + FLX KP + FLX   Re-Evaluation  KP   KP        Neuro Re-Ed                                       Ther Ex             Rec bike 5' 5' 5' 5'         Table slides     5\"x20 FLX HEP 5\"x20 FLX 5\"x20 FLX 5\"x20 scapt 10\"x20 scapt 10\"x20 scapt incline   Supine self-shoulder FLX AAROM     NV 5\"x20 5\"x20 10\"x20 10\"x20 10\"x20   Supine cross body stretch       30\"x3 30\"x3 30\"x4 30\"x4   Supine serratus punches       2x10 2x10 2x15 3x12   Pulleys     NV 5' 5' 5' 5' 5'   Scap 4     2x10 ea HEP 2x10 ea 2x10 ea YTB 2x10 ea no band 2x15 ea no band 3x12 ea no band   Strap gastroc stretch    30\"x4 HEP        Wall gastroc stretch    30\"x3 HEP        Ankle tband 4-way  X20 ea black HEP held         Standing SL Heel raises 5\"x30 SL 5\"x23 SL held held         Toe raises -  Stand 5\"x30 5\"x20 held held         BAPS board   X30 ea 4-way L2 held         SLS X10 to fatigue AE X5 to fatigue AE X3 to fatigue AE held         Wall ball squats X25 5# X30 no weight held held         Leg press 3x10 DL seated seat 5 80# 3x10 DL seated seat 5 70# held held         HEP education and instruction  x5'  x5' x15'        Ther Activity                                       Gait Training                                       Modalities             MHP R shoulder     X10' post tx X15' pre tx X10' pre tx X10' pre tx X10' pre tx X10' pre tx                                           "

## 2023-12-28 ENCOUNTER — OFFICE VISIT (OUTPATIENT)
Dept: PHYSICAL THERAPY | Facility: MEDICAL CENTER | Age: 68
End: 2023-12-28
Payer: MEDICARE

## 2023-12-28 DIAGNOSIS — M25.511 CHRONIC RIGHT SHOULDER PAIN: Primary | ICD-10-CM

## 2023-12-28 DIAGNOSIS — D48.0 NEOPLASM OF UNCERTAIN BEHAVIOR OF BONE AND ARTICULAR CARTILAGE: ICD-10-CM

## 2023-12-28 DIAGNOSIS — S86.891S MEDIAL TIBIAL STRESS SYNDROME, RIGHT, SEQUELA: ICD-10-CM

## 2023-12-28 DIAGNOSIS — G89.29 CHRONIC RIGHT SHOULDER PAIN: Primary | ICD-10-CM

## 2023-12-28 PROCEDURE — 97110 THERAPEUTIC EXERCISES: CPT | Performed by: PHYSICAL THERAPIST

## 2023-12-28 PROCEDURE — 97140 MANUAL THERAPY 1/> REGIONS: CPT | Performed by: PHYSICAL THERAPIST

## 2023-12-28 NOTE — PROGRESS NOTES
PT Re-Evaluation     Today's date: 2023  Patient name: Silvina Jay  : 1955  MRN: [id number]  Referring provider: Talon Rae Jr., MD  Dx:   Encounter Diagnosis     ICD-10-CM    1. Chronic right shoulder pain  M25.511     G89.29       2. Medial tibial stress syndrome, right, sequela  S86.891S       3. 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 She continues to have some discomfort in her shoulder when twisting to extremes or rolling in bed. She also has some discomfort when she extends back with her R arm when swimming freestyle. However, she is able to swim with less discomfort compared to previously, and she is pleased with her progress overall because her pain is not as constant. She also reports that her shin has been feeling pretty good overall since focusing more on stretching at home. She is agreeable to continue to focus on her shoulder at this time and is working on her home exercise program for her shin.      Objective: See treatment diary below      Shoulder AROM:  Elevation: L: 175 deg, R: 175 deg  BITA BTH: L: C7, R: C7  FIR BTB: L: T9, R: T10      Shoulder PROM:  FLX: L: 175 deg, R: 175 deg  IR at 90: L: 65 deg, R: 55 deg    Scapular strength:  Middle trapezius: L: 4+/5, R: 4-/5  Lower trapezius: L: 4/5, R: 4-/5      Assessment: Continued current focus on R shoulder due to R shoulder being current primary source of functional limitation and due to independence with ankle mobility HEP. Patient is demonstrating good overall progress with improving R shoulder posterior capsule mobility since her initial visit, which has improved her ability to reach when donning/doffing clothing. Although improved since IE, patient continues to demonstrate mild limitations in R  shoulder FIR AROM BTB when compared to the contralateral side along with strength deficits in her R scapular stabilizers. This contributes to compensatory stress on her R RTC when swimming. Patient is demonstrating steady progress toward her goals. She tolerated treatment well today and demonstrated improvements in R shoulder IR PROM and FIR AROM BTB after manual therapy. She was also able to progress reps for supine serratus punches and standing scapular strengthening, which further demonstrates good progress. Patient would benefit from continued PT to further improve R shoulder posterior capsule mobility and scapular stability to facilitate a full return to active lifestyle.     Goals  STG:  Patient will be independent with home exercise program.- met   Patient will demonstrate decreased swelling in R anterior ankle to improve mobility for ambulation.- met  LTG:  Patient will increase R ankle DF AROM to at least 5-10 deg to improve quality of gait mechanics.- met  Patient will increase R ankle strength to at least 4+/5 in all planes to be able to stand for longer periods.- in progress (improved)  Patient will be able to ambulate longer distances in the community.- in progress (improved)  Patient will increase R shoulder FIR BTB to be comparable to the contralateral side to improve swimming mechanics.- in progress (improved)  Patient will increase strength of R scapular stabilizers by at least 1-2 grades via MMT to reduce stress on R shoulder girdle during ADL.- in progress (in progress)  Patient will be able to participate in tennis with modifications as necessary.- in progress  Patient will be able to return to swimming.- in progress  Patient will be able to manage symptoms independently.- in progress     Plan: Continue per plan of care. 2x/week tapering to 1x/week for 3 more weeks. Continue current focus on R shoulder due to R shoulder being current primary source of functional limitation and due to independence  "with R ankle mobility HEP.     Precautions: s/p R tibial bone biopsy (5/8/23), osteoporosis, hx of R tibial stress fracture 2015, hx of R distal tib/fib stress fracture 2018    *Potential adhesive allergy        Manuals 12/4 12/7 12/14 12/19 12/21 12/26 12/28   R shoulder IR PROM KP KP + FLX KP + FLX KP + FLX KP + FLX KP + FLX KP + FLX   Re-Evaluation KP         Neuro Re-Ed                              Ther Ex          Rec bike          Table slides 5\"x20 FLX HEP 5\"x20 FLX 5\"x20 FLX 5\"x20 scapt 10\"x20 scapt 10\"x20 scapt incline 10\"x20 scapt incline   Supine self-shoulder FLX AAROM NV 5\"x20 5\"x20 10\"x20 10\"x20 10\"x20 10\"x20   Supine cross body stretch   30\"x3 30\"x3 30\"x4 30\"x4 30\"x4   Supine serratus punches   2x10 2x10 2x15 3x12 3x15   Pulleys NV 5' 5' 5' 5' 5' 5'   Scap 4 2x10 ea HEP 2x10 ea 2x10 ea YTB 2x10 ea no band 2x15 ea no band 3x12 ea no band 3x15 no band   HEP education and instruction x15'         Ther Activity                              Gait Training                              Modalities          MHP R shoulder X10' post tx X15' pre tx X10' pre tx X10' pre tx X10' pre tx X10' pre tx X10' pre tx                                          "

## 2024-01-02 ENCOUNTER — OFFICE VISIT (OUTPATIENT)
Dept: PHYSICAL THERAPY | Facility: MEDICAL CENTER | Age: 69
End: 2024-01-02
Payer: MEDICARE

## 2024-01-02 DIAGNOSIS — M25.511 CHRONIC RIGHT SHOULDER PAIN: Primary | ICD-10-CM

## 2024-01-02 DIAGNOSIS — G89.29 CHRONIC RIGHT SHOULDER PAIN: Primary | ICD-10-CM

## 2024-01-02 DIAGNOSIS — S86.891S MEDIAL TIBIAL STRESS SYNDROME, RIGHT, SEQUELA: ICD-10-CM

## 2024-01-02 DIAGNOSIS — D48.0 NEOPLASM OF UNCERTAIN BEHAVIOR OF BONE AND ARTICULAR CARTILAGE: ICD-10-CM

## 2024-01-02 PROCEDURE — 97140 MANUAL THERAPY 1/> REGIONS: CPT | Performed by: PHYSICAL THERAPIST

## 2024-01-02 PROCEDURE — 97110 THERAPEUTIC EXERCISES: CPT | Performed by: PHYSICAL THERAPIST

## 2024-01-02 NOTE — PROGRESS NOTES
Daily Note     Today's date: 2024  Patient name: Silvina Jay  : 1955  MRN: [id number]  Referring provider: Talon Rae Jr., MD  Dx:   Encounter Diagnosis     ICD-10-CM    1. Chronic right shoulder pain  M25.511     G89.29       2. Medial tibial stress syndrome, right, sequela  S86.891S       3. 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 She also reports that her shoulder is continuing to feel improved with less pain when reaching behind her back.      Objective: See treatment diary below      Assessment: Continued current focus on R shoulder due to R shoulder being current primary source of functional limitation. Very mild limitation remains in R shoulder FIR AROM BTB compared to the contralateral side. However, she responded well to manual interventions with improvements in end-range IR PROM post-tx. Continued with shoulder girdle mobility and scapular stability program. Improved eccentric control noted during supine serratus punches, which demonstrates good progress toward goals. Patient tolerated treatment well and completed program without pain. Patient would benefit from continued PT to improve shoulder girdle mobility and scapular stability to return to active lifestyle.      Plan: Continue per plan of care. Continue current focus on R shoulder due to R shoulder being current primary source of functional limitation.      Precautions: s/p R tibial bone biopsy (23), osteoporosis, hx of R tibial stress fracture , hx of R distal tib/fib stress fracture     *Potential adhesive allergy        Manuals    R shoulder IR PROM KP KP + FLX KP + FLX KP + FLX KP + FLX KP + FLX KP + FLX KP + FLX   Re-Evaluation KP          Neuro Re-Ed                                 Ther Ex           Rec bike          "  Table slides 5\"x20 FLX HEP 5\"x20 FLX 5\"x20 FLX 5\"x20 scapt 10\"x20 scapt 10\"x20 scapt incline 10\"x20 scapt incline 10\"x20 scapt incline   Supine self-shoulder FLX AAROM NV 5\"x20 5\"x20 10\"x20 10\"x20 10\"x20 10\"x20 10\"x20   Supine cross body stretch   30\"x3 30\"x3 30\"x4 30\"x4 30\"x4 30\"x4   Supine serratus punches   2x10 2x10 2x15 3x12 3x15 3x15   Pulleys NV 5' 5' 5' 5' 5' 5' 5'   Scap 4 2x10 ea HEP 2x10 ea 2x10 ea YTB 2x10 ea no band 2x15 ea no band 3x12 ea no band 3x15 no band 3x15 no band   HEP education and instruction x15'          Ther Activity                                 Gait Training                                 Modalities           MHP R shoulder X10' post tx X15' pre tx X10' pre tx X10' pre tx X10' pre tx X10' pre tx X10' pre tx X10' pre tx                                             "

## 2024-01-04 ENCOUNTER — OFFICE VISIT (OUTPATIENT)
Dept: PHYSICAL THERAPY | Facility: MEDICAL CENTER | Age: 69
End: 2024-01-04
Payer: MEDICARE

## 2024-01-04 DIAGNOSIS — D48.0 NEOPLASM OF UNCERTAIN BEHAVIOR OF BONE AND ARTICULAR CARTILAGE: ICD-10-CM

## 2024-01-04 DIAGNOSIS — G89.29 CHRONIC RIGHT SHOULDER PAIN: Primary | ICD-10-CM

## 2024-01-04 DIAGNOSIS — M25.511 CHRONIC RIGHT SHOULDER PAIN: Primary | ICD-10-CM

## 2024-01-04 DIAGNOSIS — S86.891S MEDIAL TIBIAL STRESS SYNDROME, RIGHT, SEQUELA: ICD-10-CM

## 2024-01-04 PROCEDURE — 97140 MANUAL THERAPY 1/> REGIONS: CPT | Performed by: PHYSICAL THERAPIST

## 2024-01-04 PROCEDURE — 97110 THERAPEUTIC EXERCISES: CPT | Performed by: PHYSICAL THERAPIST

## 2024-01-04 NOTE — PROGRESS NOTES
Daily Note     Today's date: 2024  Patient name: Silvina Jay  : 1955  MRN: [id number]  Referring provider: Talon Rae Jr., MD  Dx:   Encounter Diagnosis     ICD-10-CM    1. Chronic right shoulder pain  M25.511     G89.29       2. Medial tibial stress syndrome, right, sequela  S86.891S       3. 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 She has some tightness when she reaches behind her back, but she has not been having any sharp pain. She will be trialing swimming again in the upcoming days. She also has less pain in her ankle since purchasing new sneakers.      Objective: See treatment diary below      Assessment: Continued to focus on R shoulder due to R shoulder being current primary source of functional limitation. Patient presented with very mild limitation in R shoulder FIR AROM BTB compared to the contralateral side but responded well to manual therapy with patient achieving R shoulder FIR AROM BTB equal to the contralateral side post-tx. Able to progress resistance for supine serratus punches, which demonstrates good progress toward goals. Patient tolerated treatment well and reported no pain during or after session. Patient would benefit from continued PT to further improve shoulder mobility and scapular stability to facilitate a return to swimming.      Plan: Continue per plan of care. Continue current focus on R shoulder due to R shoulder being current primary source of functional limitation. Will address R ankle as needed in the future.     Precautions: s/p R tibial bone biopsy (23), osteoporosis, hx of R tibial stress fracture , hx of R distal tib/fib stress fracture     *Potential adhesive allergy        Manuals    R shoulder IR PROM KP KP + FLX KP + FLX KP + FLX KP + FLX KP + FLX KP + FLX  "KP + FLX KP    Re-Evaluation KP           Neuro Re-Ed                                    Ther Ex            Rec bike            Table slides 5\"x20 FLX HEP 5\"x20 FLX 5\"x20 FLX 5\"x20 scapt 10\"x20 scapt 10\"x20 scapt incline 10\"x20 scapt incline 10\"x20 scapt incline 10\"x20 scapt incline   Supine self-shoulder FLX AAROM NV 5\"x20 5\"x20 10\"x20 10\"x20 10\"x20 10\"x20 10\"x20 10\"x20   Supine cross body stretch   30\"x3 30\"x3 30\"x4 30\"x4 30\"x4 30\"x4 30\"x4   Supine serratus punches   2x10 2x10 2x15 3x12 3x15 3x15 2x10 1#   Pulleys NV 5' 5' 5' 5' 5' 5' 5' 5'   Scap 4 2x10 ea HEP 2x10 ea 2x10 ea YTB 2x10 ea no band 2x15 ea no band 3x12 ea no band 3x15 no band 3x15 no band 3x15 no band   HEP education and instruction x15'           Ther Activity                                    Gait Training                                    Modalities            MHP R shoulder X10' post tx X15' pre tx X10' pre tx X10' pre tx X10' pre tx X10' pre tx X10' pre tx X10' pre tx X10' pre tx                                                "

## 2024-01-09 ENCOUNTER — OFFICE VISIT (OUTPATIENT)
Dept: PHYSICAL THERAPY | Facility: MEDICAL CENTER | Age: 69
End: 2024-01-09
Payer: MEDICARE

## 2024-01-09 DIAGNOSIS — M25.511 CHRONIC RIGHT SHOULDER PAIN: Primary | ICD-10-CM

## 2024-01-09 DIAGNOSIS — G89.29 CHRONIC RIGHT SHOULDER PAIN: Primary | ICD-10-CM

## 2024-01-09 DIAGNOSIS — D48.0 NEOPLASM OF UNCERTAIN BEHAVIOR OF BONE AND ARTICULAR CARTILAGE: ICD-10-CM

## 2024-01-09 DIAGNOSIS — S86.891S MEDIAL TIBIAL STRESS SYNDROME, RIGHT, SEQUELA: ICD-10-CM

## 2024-01-09 PROCEDURE — 97110 THERAPEUTIC EXERCISES: CPT | Performed by: PHYSICAL THERAPIST

## 2024-01-09 PROCEDURE — 97140 MANUAL THERAPY 1/> REGIONS: CPT | Performed by: PHYSICAL THERAPIST

## 2024-01-09 NOTE — PROGRESS NOTES
Daily Note     Today's date: 2024  Patient name: Silvina Jay  : 1955  MRN: [id number]  Referring provider: Talon Rae Jr., MD  Dx:   Encounter Diagnosis     ICD-10-CM    1. Chronic right shoulder pain  M25.511     G89.29       2. Medial tibial stress syndrome, right, sequela  S86.891S       3. 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 She continues to have some discomfort in the top of her shoulder during freestyle, but this is not as intense as previously. She also does not need to twist her body as much to compensate for her shoulder. She had some clicking in her ankle last night, but did not have any pain after kicking.      Objective: See treatment diary below      Assessment: Continued to focus on R shoulder due to R shoulder being current primary source of functional limitation. Patient demonstrates very mild limitation in end-range R shoulder FIR AROM BTB compared to the contralateral side but continues to respond well to manual therapy with an improvement in end-range IR PROM post-tx. Able to progress reps for supine serratus punches, which demonstrates good progress towards goals. Patient tolerated treatment well and completed program in a pain-free range. Patient would benefit from continued PT to maximize independence with HEP prior to upcoming d/c.       Plan: Continue per plan of care. Potential d/c next visit.     Precautions: s/p R tibial bone biopsy (23), osteoporosis, hx of R tibial stress fracture , hx of R distal tib/fib stress fracture     *Potential adhesive allergy        Manuals    R shoulder IR PROM KP KP + FLX KP + FLX KP + FLX KP + FLX KP + FLX KP + FLX KP + FLX KP  KP   Re-Evaluation KP            Neuro Re-Ed        "                                Ther Ex             Rec bike             Table slides 5\"x20 FLX HEP 5\"x20 FLX 5\"x20 FLX 5\"x20 scapt 10\"x20 scapt 10\"x20 scapt incline 10\"x20 scapt incline 10\"x20 scapt incline 10\"x20 scapt incline 10\"x20 scapt incline   Supine self-shoulder FLX AAROM NV 5\"x20 5\"x20 10\"x20 10\"x20 10\"x20 10\"x20 10\"x20 10\"x20 10\"x20   Supine cross body stretch   30\"x3 30\"x3 30\"x4 30\"x4 30\"x4 30\"x4 30\"x4 30\"x4   Supine serratus punches   2x10 2x10 2x15 3x12 3x15 3x15 2x10 1# 3x10 1#   Pulleys NV 5' 5' 5' 5' 5' 5' 5' 5' 5'   Scap 4 2x10 ea HEP 2x10 ea 2x10 ea YTB 2x10 ea no band 2x15 ea no band 3x12 ea no band 3x15 no band 3x15 no band 3x15 no band 3x15 no band   HEP education and instruction x15'            Ther Activity                                       Gait Training                                       Modalities             MHP R shoulder X10' post tx X15' pre tx X10' pre tx X10' pre tx X10' pre tx X10' pre tx X10' pre tx X10' pre tx X10' pre tx X10' pre tx                                                   "

## 2024-01-11 ENCOUNTER — OFFICE VISIT (OUTPATIENT)
Dept: PHYSICAL THERAPY | Facility: MEDICAL CENTER | Age: 69
End: 2024-01-11
Payer: MEDICARE

## 2024-01-11 DIAGNOSIS — D48.0 NEOPLASM OF UNCERTAIN BEHAVIOR OF BONE AND ARTICULAR CARTILAGE: ICD-10-CM

## 2024-01-11 DIAGNOSIS — G89.29 CHRONIC RIGHT SHOULDER PAIN: Primary | ICD-10-CM

## 2024-01-11 DIAGNOSIS — S86.891S MEDIAL TIBIAL STRESS SYNDROME, RIGHT, SEQUELA: ICD-10-CM

## 2024-01-11 DIAGNOSIS — M25.511 CHRONIC RIGHT SHOULDER PAIN: Primary | ICD-10-CM

## 2024-01-11 PROCEDURE — 97110 THERAPEUTIC EXERCISES: CPT | Performed by: PHYSICAL THERAPIST

## 2024-01-11 PROCEDURE — 97140 MANUAL THERAPY 1/> REGIONS: CPT | Performed by: PHYSICAL THERAPIST

## 2024-01-16 ENCOUNTER — APPOINTMENT (OUTPATIENT)
Dept: PHYSICAL THERAPY | Facility: MEDICAL CENTER | Age: 69
End: 2024-01-16
Payer: MEDICARE

## 2024-01-18 ENCOUNTER — APPOINTMENT (OUTPATIENT)
Dept: PHYSICAL THERAPY | Facility: MEDICAL CENTER | Age: 69
End: 2024-01-18
Payer: MEDICARE

## 2025-01-10 ENCOUNTER — EVALUATION (OUTPATIENT)
Dept: PHYSICAL THERAPY | Facility: MEDICAL CENTER | Age: 70
End: 2025-01-10
Payer: MEDICARE

## 2025-01-10 DIAGNOSIS — M54.50 ACUTE RIGHT-SIDED LOW BACK PAIN WITHOUT SCIATICA: ICD-10-CM

## 2025-01-10 DIAGNOSIS — M25.551 RIGHT HIP PAIN: Primary | ICD-10-CM

## 2025-01-10 PROCEDURE — 97161 PT EVAL LOW COMPLEX 20 MIN: CPT | Performed by: PHYSICAL THERAPIST

## 2025-01-10 NOTE — PROGRESS NOTES
PT Evaluation     Today's date: 1/10/2025  Patient name: Silvina Jay  : 1955  MRN: [id number]  Referring provider: Talon Rae Jr., MD  Dx:   Encounter Diagnosis     ICD-10-CM    1. Right hip pain  M25.551 Ambulatory Referral to Physical Therapy      2. Acute right-sided low back pain without sciatica  M54.50                      Assessment  Impairments: abnormal gait, abnormal muscle firing, abnormal muscle tone, abnormal or restricted ROM, activity intolerance, impaired physical strength, lacks appropriate home exercise program, pain with function, participation limitations and activity limitations  Functional limitations: ambulation, bed transfers, car transfers  Symptom irritability: moderate    Assessment details: Silvina Jay is a pleasant 69 y.o. female who presents with acute R lateral hip and R lumbar spine pain for approximately 1 month after twisting when working in her kitchen. No further referral is necessary at this time based upon examination results.    Primary movement impairments are lumbar hypomobility and concurrent soft tissue tightness in R lumbar paraspinals/erector spinae, which contribute to signs and symptoms consistent with mechanical hip pain and R-sided low back pain, and limit her ability to perform transfers and ambulate to her prior capacity. Patient also presents with R hip girdle weakness and core strength deficits, which contribute to recurrence of pain and limit her ability to participate in swimming and exercise routine to her prior capacity. Patient was educated in an illustrated HEP for lumbopelvic mobility, and she reported decreased pain after performing these exercises. Patient was also educated to utilize MHP at home to further reduce tightness. In addition, she was educated to rest from cardiovascular exercise at this time until following up with PCP to review results of stress test. Patient would benefit from skilled PT services to address the  listed impairments to facilitate a return to PLOF. Thank you for the referral.    Barriers to therapy: none  Understanding of Dx/Px/POC: good     Prognosis: good  Prognosis details: Positive prognostic factors include positive attitude towards recovery. No negative prognostic factors.    Goals  STG:  Patient will be independent with home exercise program.   Patient will demonstrate at least 25%-50% reduction in palpable soft tissue tightness in R lumbar paraspinals/erector spinae to improve ability to perform transfers.  LTG:  Patient will increase L lumbar ROT AROM to be comparable to the contralateral side to be able to roll in bed.   Patient will increase R hip strength to be at least 4+/5 in all planes to be able to ambulate longer distances.  Patient will be able to transfer into and out of the car.  Patient will be able to swim.  Patient will be able to manage symptoms independently.     Plan  Patient would benefit from: skilled physical therapy  Referral necessary: No  Planned modality interventions: cryotherapy and thermotherapy: hydrocollator packs    Planned therapy interventions: abdominal trunk stabilization, activity modification, IASTM, joint mobilization, kinesiology taping, manual therapy, massage, Wheatley taping, motor coordination training, body mechanics training, neuromuscular re-education, patient/caregiver education, strengthening, stretching, therapeutic activities, therapeutic exercise, functional ROM exercises, flexibility, gait training, graded exercise, graded activity, home exercise program and postural training    Frequency: 2x week (tapering to 1x/week)  Duration in weeks: 6  Plan of Care beginning date: 1/10/2025  Plan of Care expiration date: 2/14/2025  Treatment plan discussed with: patient  Plan details: Prognosis is above given PT services 2x/week tapering to 1x/week for 6 weeks and given HEP adherence.    Subjective Evaluation    History of Present Illness  Mechanism of injury:  This is a 69 y.o. female presenting with R hip pain for the last month. She reports that 2 weeks prior to the onset of pain, she developed a twinge in her R lumbar spine/hip when twisting in the kitchen. She developed significant pain and had difficulty moving. She then had a massage, which significantly helped. However, the pain worsened a few weeks later after doing repetitive bending and lifting when decorating. The pain is located in the R side of her low back and R outer hip. She reports no radiating pain into her LE. She did have 1 episode of numbness/tingling in the plantar aspect of her L great toe a few weeks ago, but this was intermittent and has completely resolved. No bowel/bladder changes and no foot drop reported. She reports that overall she has been feeling fatigued and has been having episodes of shortness of breath. She reports that her PCP is aware of this change, and she underwent a stress test yesterday (1/10). She will be following up with her PCP on 25 to review the results of her stress test.           Not a recurrent problem   Quality of life: good    Patient Goals  Patient goals for therapy: decreased pain, increased strength, increased motion, return to sport/leisure activities and independence with ADLs/IADLs  Patient goal: to be able to walk, to be able to swim, to be able to roll, to be able to get into and out of bed  Pain  Current pain ratin  At best pain ratin  At worst pain rating: 10  Location: pain in outside of R hip, tightness in R side of back  Quality: sharp, tight and dull ache  Relieving factors: medications and rest (Tylenol, massage)  Aggravating factors: walking and lifting (bending, rolling, car transfers, getting out of bed in the morning)  Progression: improved    Treatments  Previous treatment: massage      Objective     Concurrent Complaints  Negative for bladder dysfunction and bowel dysfunction    Additional Special Questions  No radiating pain into  LE, no foot drop    Static Posture     Comments  L lateral shift    Palpation     Right   Hypertonic in the erector spinae, lumbar paraspinals and quadratus lumborum.   Tenderness of the erector spinae, lumbar paraspinals and quadratus lumborum.     Neurological Testing     Sensation     Lumbar   Left   Intact: light touch    Right   Intact: light touch    Reflexes   Left   Patellar (L4): normal (2+)  Achilles (S1): trace (1+)  Babinski sign: negative  Clonus sign: negative    Right   Patellar (L4): normal (2+)  Achilles (S1): trace (1+)  Babinski sign: negative  Clonus sign: negative    Active Range of Motion     Lumbar   Flexion:  Restriction level: moderate  Extension:  Restriction level: maximal  Left lateral flexion: Active left lumbar lateral flexion: pain in R low back.    with pain Restriction level: minimal  Right lateral flexion:  Restriction level: minimal  Left rotation:  Restriction level: moderate  Right rotation:  Restriction level: minimal  Left Hip   External rotation (90/90): 35 degrees   Internal rotation (90/90): 45 degrees     Right Hip   External rotation (90/90): 35 degrees   Internal rotation (90/90): 45 degrees     Additional Active Range of Motion Details  Standing lumbar FLX/EXT AROM repeated motion testing unremarkable    Passive Range of Motion   Left Hip   External rotation (90/90): 50 degrees   Internal rotation (90/90): 45 degrees     Right Hip   External rotation (90/90): 50 degrees   Internal rotation (90/90): 45 degrees     Strength/Myotome Testing     Left Hip   Planes of Motion   Flexion: 4+  Abduction: 4  External rotation: 4+  Internal rotation: 4+    Right Hip   Planes of Motion   Flexion: 4  Abduction: 3+  External rotation: 4  Internal rotation: 4    Left Knee   Flexion: 5  Extension: 5    Right Knee   Flexion: 5  Extension: 5    Left Ankle/Foot   Dorsiflexion: 5  Plantar flexion: 5  Great toe extension strength: unable to isolate.    Right Ankle/Foot   Dorsiflexion:  "5  Plantar flexion: 5  Great toe extension strength: unable to isolate.    Additional Strength Details  Myotomes intact and symmetrical bilaterally L2-S1; patient presents with gross peripheral strength deficits in bilateral hip girdle musculature (R hip weaker than L)    Muscle Activation     Additional Muscle Activation Details  Unable to activate bilateral TA/multifidus    Tests     Lumbar     Left   Negative passive SLR.     Right   Positive passive SLR.     Left Hip   Negative SHAQ, FADIR and long sit.     Right Hip   Positive SHAQ (for LBP).   Negative FADIR and long sit.     Ambulation     Observational Gait   Gait: asymmetric     Comments   Upper extremity shifted to the L when ambulating    General Comments:      Lumbar Comments  Lumbar PA joint play not assessed due to difficulty achieving testing position             Precautions: s/p R tibial bone biopsy (5/8/23), osteoporosis, hx of R tibial stress fracture 2015, hx of R distal tib/fib stress fracture 2018    *ADHESIVE ALLERGY       HEP: LTR, SKTC  Manuals 1/10            STM R lumbar paraspinals NV                                                   Neuro Re-Ed                                                                                                        Ther Ex             LTR 5\"x10 b/l HEP            SKTC 5\"x10 HEP            TA activation with pball NV            Supine clams NV RTB            Supine hip ADD isometrics NV            Seated pball rollouts  NV            L lateral shift self-correction at wall NV- L shoulders at wall                                      Ther Activity                                       Gait Training                                       Modalities             MHP lumbar  X10' seated Pre tx                             "

## 2025-01-10 NOTE — LETTER
January 10, 2025    Talon Rae Jr., MD  [address] [place] [id number]    Patient: Silvina Jay   YOB: 1955   Date of Visit: 1/10/2025     Encounter Diagnosis     ICD-10-CM    1. Right hip pain  M25.551 Ambulatory Referral to Physical Therapy      2. Acute right-sided low back pain without sciatica  M54.50           Dear Dr. Rae:    Thank you for your recent referral of Silvina Jay. Please review the attached evaluation summary from Silvina's recent visit.     Please verify that you agree with the plan of care by signing the attached order.     If you have any questions or concerns, please do not hesitate to call.     I sincerely appreciate the opportunity to share in the care of one of your patients and hope to have another opportunity to work with you in the near future.       Sincerely,    Cristy Hendrickson, PT      Referring Provider:      I certify that I have read the below Plan of Care and certify the need for these services furnished under this plan of treatment while under my care.                    Talon Rae Jr., MD  [address] [place] [id number]  [facility] [address]  Via Fax: [phone]          PT Evaluation     Today's date: 1/10/2025  Patient name: Silvina Jay  : 1955  MRN: [id number]  Referring provider: Talon Rae Jr., MD  Dx:   Encounter Diagnosis     ICD-10-CM    1. Right hip pain  M25.551 Ambulatory Referral to Physical Therapy      2. Acute right-sided low back pain without sciatica  M54.50                      Assessment  Impairments: abnormal gait, abnormal muscle firing, abnormal muscle tone, abnormal or restricted ROM, activity intolerance, impaired physical strength, lacks appropriate home exercise program, pain with function, participation limitations and activity limitations  Functional limitations: ambulation, bed transfers, car transfers  Symptom irritability: moderate    Assessment details: Silvina Jay  is a pleasant 69 y.o. female who presents with acute R lateral hip and R lumbar spine pain for approximately 1 month after twisting when working in her kitchen. No further referral is necessary at this time based upon examination results.    Primary movement impairments are lumbar hypomobility and concurrent soft tissue tightness in R lumbar paraspinals/erector spinae, which contribute to signs and symptoms consistent with mechanical hip pain and R-sided low back pain, and limit her ability to perform transfers and ambulate to her prior capacity. Patient also presents with R hip girdle weakness and core strength deficits, which contribute to recurrence of pain and limit her ability to participate in swimming and exercise routine to her prior capacity. Patient was educated in an illustrated HEP for lumbopelvic mobility, and she reported decreased pain after performing these exercises. Patient was also educated to utilize MHP at home to further reduce tightness. In addition, she was educated to rest from cardiovascular exercise at this time until following up with PCP to review results of stress test. Patient would benefit from skilled PT services to address the listed impairments to facilitate a return to PLOF. Thank you for the referral.    Barriers to therapy: none  Understanding of Dx/Px/POC: good     Prognosis: good  Prognosis details: Positive prognostic factors include positive attitude towards recovery. No negative prognostic factors.    Goals  STG:  Patient will be independent with home exercise program.   Patient will demonstrate at least 25%-50% reduction in palpable soft tissue tightness in R lumbar paraspinals/erector spinae to improve ability to perform transfers.  LTG:  Patient will increase L lumbar ROT AROM to be comparable to the contralateral side to be able to roll in bed.   Patient will increase R hip strength to be at least 4+/5 in all planes to be able to ambulate longer distances.  Patient will  be able to transfer into and out of the car.  Patient will be able to swim.  Patient will be able to manage symptoms independently.     Plan  Patient would benefit from: skilled physical therapy  Referral necessary: No  Planned modality interventions: cryotherapy and thermotherapy: hydrocollator packs    Planned therapy interventions: abdominal trunk stabilization, activity modification, IASTM, joint mobilization, kinesiology taping, manual therapy, massage, Wheatley taping, motor coordination training, body mechanics training, neuromuscular re-education, patient/caregiver education, strengthening, stretching, therapeutic activities, therapeutic exercise, functional ROM exercises, flexibility, gait training, graded exercise, graded activity, home exercise program and postural training    Frequency: 2x week (tapering to 1x/week)  Duration in weeks: 6  Plan of Care beginning date: 1/10/2025  Plan of Care expiration date: 2/14/2025  Treatment plan discussed with: patient  Plan details: Prognosis is above given PT services 2x/week tapering to 1x/week for 6 weeks and given HEP adherence.    Subjective Evaluation    History of Present Illness  Mechanism of injury: This is a 69 y.o. female presenting with R hip pain for the last month. She reports that 2 weeks prior to the onset of pain, she developed a twinge in her R lumbar spine/hip when twisting in the kitchen. She developed significant pain and had difficulty moving. She then had a massage, which significantly helped. However, the pain worsened a few weeks later after doing repetitive bending and lifting when decorating. The pain is located in the R side of her low back and R outer hip. She reports no radiating pain into her LE. She did have 1 episode of numbness/tingling in the plantar aspect of her L great toe a few weeks ago, but this was intermittent and has completely resolved. No bowel/bladder changes and no foot drop reported. She reports that overall she has  been feeling fatigued and has been having episodes of shortness of breath. She reports that her PCP is aware of this change, and she underwent a stress test yesterday (1/10). She will be following up with her PCP on 25 to review the results of her stress test.           Not a recurrent problem   Quality of life: good    Patient Goals  Patient goals for therapy: decreased pain, increased strength, increased motion, return to sport/leisure activities and independence with ADLs/IADLs  Patient goal: to be able to walk, to be able to swim, to be able to roll, to be able to get into and out of bed  Pain  Current pain ratin  At best pain ratin  At worst pain rating: 10  Location: pain in outside of R hip, tightness in R side of back  Quality: sharp, tight and dull ache  Relieving factors: medications and rest (Tylenol, massage)  Aggravating factors: walking and lifting (bending, rolling, car transfers, getting out of bed in the morning)  Progression: improved    Treatments  Previous treatment: massage      Objective     Concurrent Complaints  Negative for bladder dysfunction and bowel dysfunction    Additional Special Questions  No radiating pain into LE, no foot drop    Static Posture     Comments  L lateral shift    Palpation     Right   Hypertonic in the erector spinae, lumbar paraspinals and quadratus lumborum.   Tenderness of the erector spinae, lumbar paraspinals and quadratus lumborum.     Neurological Testing     Sensation     Lumbar   Left   Intact: light touch    Right   Intact: light touch    Reflexes   Left   Patellar (L4): normal (2+)  Achilles (S1): trace (1+)  Babinski sign: negative  Clonus sign: negative    Right   Patellar (L4): normal (2+)  Achilles (S1): trace (1+)  Babinski sign: negative  Clonus sign: negative    Active Range of Motion     Lumbar   Flexion:  Restriction level: moderate  Extension:  Restriction level: maximal  Left lateral flexion: Active left lumbar lateral flexion: pain  in R low back.    with pain Restriction level: minimal  Right lateral flexion:  Restriction level: minimal  Left rotation:  Restriction level: moderate  Right rotation:  Restriction level: minimal  Left Hip   External rotation (90/90): 35 degrees   Internal rotation (90/90): 45 degrees     Right Hip   External rotation (90/90): 35 degrees   Internal rotation (90/90): 45 degrees     Additional Active Range of Motion Details  Standing lumbar FLX/EXT AROM repeated motion testing unremarkable    Passive Range of Motion   Left Hip   External rotation (90/90): 50 degrees   Internal rotation (90/90): 45 degrees     Right Hip   External rotation (90/90): 50 degrees   Internal rotation (90/90): 45 degrees     Strength/Myotome Testing     Left Hip   Planes of Motion   Flexion: 4+  Abduction: 4  External rotation: 4+  Internal rotation: 4+    Right Hip   Planes of Motion   Flexion: 4  Abduction: 3+  External rotation: 4  Internal rotation: 4    Left Knee   Flexion: 5  Extension: 5    Right Knee   Flexion: 5  Extension: 5    Left Ankle/Foot   Dorsiflexion: 5  Plantar flexion: 5  Great toe extension strength: unable to isolate.    Right Ankle/Foot   Dorsiflexion: 5  Plantar flexion: 5  Great toe extension strength: unable to isolate.    Additional Strength Details  Myotomes intact and symmetrical bilaterally L2-S1; patient presents with gross peripheral strength deficits in bilateral hip girdle musculature (R hip weaker than L)    Muscle Activation     Additional Muscle Activation Details  Unable to activate bilateral TA/multifidus    Tests     Lumbar     Left   Negative passive SLR.     Right   Positive passive SLR.     Left Hip   Negative SHAQ, FADIR and long sit.     Right Hip   Positive SHAQ (for LBP).   Negative FADIR and long sit.     Ambulation     Observational Gait   Gait: asymmetric     Comments   Upper extremity shifted to the L when ambulating    General Comments:      Lumbar Comments  Lumbar PA joint play not  "assessed due to difficulty achieving testing position             Precautions: s/p R tibial bone biopsy (5/8/23), osteoporosis, hx of R tibial stress fracture 2015, hx of R distal tib/fib stress fracture 2018    *ADHESIVE ALLERGY       HEP: LTR, SKTC  Manuals 1/10            STM R lumbar paraspinals NV                                                   Neuro Re-Ed                                                                                                        Ther Ex             LTR 5\"x10 b/l HEP            SKTC 5\"x10 HEP            TA activation with pball NV            Supine clams NV RTB            Supine hip ADD isometrics NV            Seated pball rollouts  NV            L lateral shift self-correction at wall NV- L shoulders at wall                                      Ther Activity                                       Gait Training                                       Modalities             MHP lumbar  X10' seated Pre tx                                              "

## 2025-01-13 ENCOUNTER — OFFICE VISIT (OUTPATIENT)
Dept: PHYSICAL THERAPY | Facility: MEDICAL CENTER | Age: 70
End: 2025-01-13
Payer: MEDICARE

## 2025-01-13 DIAGNOSIS — M25.551 RIGHT HIP PAIN: Primary | ICD-10-CM

## 2025-01-13 DIAGNOSIS — M54.50 ACUTE RIGHT-SIDED LOW BACK PAIN WITHOUT SCIATICA: ICD-10-CM

## 2025-01-13 PROCEDURE — 97110 THERAPEUTIC EXERCISES: CPT | Performed by: PHYSICAL THERAPIST

## 2025-01-13 PROCEDURE — 97140 MANUAL THERAPY 1/> REGIONS: CPT | Performed by: PHYSICAL THERAPIST

## 2025-01-13 NOTE — PROGRESS NOTES
"Daily Note     Today's date: 2025  Patient name: Silvina Jay  : 1955  MRN: [id number]  Referring provider: Talon Rae Jr., MD  Dx:   Encounter Diagnosis     ICD-10-CM    1. Right hip pain  M25.551       2. Acute right-sided low back pain without sciatica  M54.50                      Subjective: Patient reports that she had some pain in the R side of her back yesterday, but she is feeling good this morning. She notes that she feels pretty good when performing her exercises at home.      Objective: See treatment diary below      Assessment: Performed gentle STM to R lumbar paraspinals to address soft tissue tightness, and patient tolerated manual therapy well with report of decreased tightness post-tx. Initiated gentle lumbopelvic mobility and core/hip girdle stability program. Patient demonstrated improved ability to perform supine to sit and sit to supine transfers today compared to initial evaluation. She also responded well to lateral shift correction at wall and demonstrated decreased L lateral trunk lean and improved symmetry when ambulating post-tx. Patient tolerated treatment well and completed program without pain. Patient would benefit from continued PT to address impairments to maximize function.      Plan: Continue per plan of care.      Precautions: s/p R tibial bone biopsy (23), osteoporosis, hx of R tibial stress fracture , hx of R distal tib/fib stress fracture 2018    *ADHESIVE ALLERGY       HEP: LTR, SKTC  Manuals 1/10 1/13           STM R lumbar paraspinals NV                                                   Neuro Re-Ed                                                                                                        Ther Ex             LTR 5\"x10 b/l HEP 5\"x10 b/l           SKTC 5\"x10 HEP 10\"x10 R           TA activation with pball NV 3\"x20 supine           Supine clams NV RTB 5\"x20 RTB           Supine hip ADD isometrics NV 5\"x20           Seated pball " "rollouts  NV 5\"x10 fwd           Lateral shift self-correction at wall NV 5\"x10 R shoulder at wall                                     Ther Activity                                       Gait Training                                       Modalities             MHP lumbar  X10' seated X10' seated pre tx                             "

## 2025-01-16 ENCOUNTER — OFFICE VISIT (OUTPATIENT)
Dept: PHYSICAL THERAPY | Facility: MEDICAL CENTER | Age: 70
End: 2025-01-16
Payer: MEDICARE

## 2025-01-16 DIAGNOSIS — M54.50 ACUTE RIGHT-SIDED LOW BACK PAIN WITHOUT SCIATICA: ICD-10-CM

## 2025-01-16 DIAGNOSIS — M25.551 RIGHT HIP PAIN: Primary | ICD-10-CM

## 2025-01-16 PROCEDURE — 97110 THERAPEUTIC EXERCISES: CPT | Performed by: PHYSICAL THERAPIST

## 2025-01-16 PROCEDURE — 97140 MANUAL THERAPY 1/> REGIONS: CPT | Performed by: PHYSICAL THERAPIST

## 2025-01-16 NOTE — PROGRESS NOTES
"Daily Note     Today's date: 2025  Patient name: Silvina Jay  : 1955  MRN: [id number]  Referring provider: Talon Rae Jr., MD  Dx:   Encounter Diagnosis     ICD-10-CM    1. Right hip pain  M25.551       2. Acute right-sided low back pain without sciatica  M54.50                      Subjective: Patient reports that she is noticing improvements with less pain in her back. She continues to have some discomfort in her the outside of her R hip, but this is not as intense as previously.       Objective: See treatment diary below      Assessment: Continued with STM to R lumbar paraspinals to address soft tissue restrictions, and patient responded well to manual therapy with report of alleviation of symptoms when ambulating post-tx. Patient demonstrated improved ability to perform transfers today throughout session, which demonstrates good progress toward her goals. She also demonstrated  improved symmetry when ambulating after self-lateral shift correction at wall. Patient tolerated treatment well and completed program without pain. Patient would benefit from continued PT to further progress treatment as appropriate to achieve goals.      Plan: Continue per plan of care.      Precautions: s/p R tibial bone biopsy (23), osteoporosis, hx of R tibial stress fracture , hx of R distal tib/fib stress fracture     *ADHESIVE ALLERGY       HEP: LTR, SKTC  Manuals 1/10 1/13 1/16          STM R lumbar paraspinals NV KP                                                  Neuro Re-Ed                                                                                                        Ther Ex             LTR 5\"x10 b/l HEP 5\"x10 b/l 5\"x10 b/l          SKTC 5\"x10 HEP 10\"x10 R 10\"x10 R          TA activation with pball NV 3\"x20 supine 3\"x20 supine          Supine clams NV RTB 5\"x20 RTB 5\"x20 RTB          Supine hip ADD isometrics NV 5\"x20 5\"x20          Seated pball rollouts  NV 5\"x10 fwd 5\"x10 fwd     " "     Lateral shift self-correction at wall NV 5\"x10 R shoulder at wall 5\"x10 (x2) R shoulder at wall                                    Ther Activity                                       Gait Training                                       Modalities             MHP lumbar  X10' seated X10' seated pre tx X10' seated pre tx                              "

## 2025-01-21 ENCOUNTER — OFFICE VISIT (OUTPATIENT)
Dept: PHYSICAL THERAPY | Facility: MEDICAL CENTER | Age: 70
End: 2025-01-21
Payer: MEDICARE

## 2025-01-21 DIAGNOSIS — M25.551 RIGHT HIP PAIN: Primary | ICD-10-CM

## 2025-01-21 DIAGNOSIS — M54.50 ACUTE RIGHT-SIDED LOW BACK PAIN WITHOUT SCIATICA: ICD-10-CM

## 2025-01-21 PROCEDURE — 97140 MANUAL THERAPY 1/> REGIONS: CPT | Performed by: PHYSICAL THERAPIST

## 2025-01-21 PROCEDURE — 97110 THERAPEUTIC EXERCISES: CPT | Performed by: PHYSICAL THERAPIST

## 2025-01-21 NOTE — PROGRESS NOTES
"Daily Note     Today's date: 2025  Patient name: Silvina Jay  : 1955  MRN: [id number]  Referring provider: Talon Rae Jr., MD  Dx:   Encounter Diagnosis     ICD-10-CM    1. Right hip pain  M25.551       2. Acute right-sided low back pain without sciatica  M54.50                      Subjective: Patient reports that she is noticing improvements with less intense pain in her hip and her back. She also feels that she has less stiffness. She went for a stress test yesterday, and she had less pain when walking on the treadmill compared to a prior test.      Objective: See treatment diary below      Assessment: Continued with STM to R lumbar paraspinals to address soft tissue restrictions, and patient responded well to manual therapy with report of decreased tightness post-tx. She initially reported R medial scapular discomfort when lying prone during STM, but this resolved after addition of prone pillow. Patient continues to demonstrate increased speed with performing supine to sit and sit to supine transfers. She also demonstrates less lateral trunk lean when ambulating. Able to increase reps for supine clams and ADD isometrics, which demonstrates an improvement in proximal endurance. Patient tolerated treatment well and completed program without pain. Patient would benefit from continued PT to further improve lumbopelvic mobility and stability to achieve goals.       Plan: Continue per plan of care.      Precautions: s/p R tibial bone biopsy (23), osteoporosis, hx of R tibial stress fracture , hx of R distal tib/fib stress fracture     *ADHESIVE ALLERGY       HEP: LTR, SKTC  Manuals 1/10 1/13 1/16 1/21         STM R lumbar paraspinals NV KP KP KP (use prone pillow)                                                Neuro Re-Ed                                                                                                        Ther Ex             LTR 5\"x10 b/l HEP 5\"x10 b/l 5\"x10 b/l " "5\"x10 b/l         SKTC 5\"x10 HEP 10\"x10 R 10\"x10 R 10\"x10 R         TA activation with pball NV 3\"x20 supine 3\"x20 supine 3\"x20 supine         Supine clams NV RTB 5\"x20 RTB 5\"x20 RTB 3\"x25 RTB         Supine hip ADD isometrics NV 5\"x20 5\"x20 5\"x30         Seated pball rollouts  NV 5\"x10 fwd 5\"x10 fwd 10\"x10 fwd         Lateral shift self-correction at wall NV 5\"x10 R shoulder at wall 5\"x10 (x2) R shoulder at wall 5\"x10 (x2) R shoulder at wall                                   Ther Activity                                       Gait Training                                       Modalities             MHP lumbar  X10' seated X10' seated pre tx X10' seated pre tx X10' seated pre tx                               "

## 2025-01-24 ENCOUNTER — OFFICE VISIT (OUTPATIENT)
Dept: PHYSICAL THERAPY | Facility: MEDICAL CENTER | Age: 70
End: 2025-01-24
Payer: MEDICARE

## 2025-01-24 DIAGNOSIS — M54.50 ACUTE RIGHT-SIDED LOW BACK PAIN WITHOUT SCIATICA: ICD-10-CM

## 2025-01-24 DIAGNOSIS — M25.551 RIGHT HIP PAIN: Primary | ICD-10-CM

## 2025-01-24 PROCEDURE — 97110 THERAPEUTIC EXERCISES: CPT | Performed by: PHYSICAL THERAPIST

## 2025-01-24 PROCEDURE — 97140 MANUAL THERAPY 1/> REGIONS: CPT | Performed by: PHYSICAL THERAPIST

## 2025-01-24 NOTE — PROGRESS NOTES
Daily Note     Today's date: 2025  Patient name: Silvina Jay  : 1955  MRN: [id number]  Referring provider: Talon Rae Jr., MD  Dx:   Encounter Diagnosis     ICD-10-CM    1. Right hip pain  M25.551       2. Acute right-sided low back pain without sciatica  M54.50                      Subjective: Patient reports that she is feeling much improved with less intense pain in her R hip and her low back. She has some discomfort with quick transitional movements, but she is pleased with her overall progress. She desires to return to pool exercises and lap swimming in the future.      Objective: See treatment diary below      Assessment: Patient is continuing to demonstrate increased gait speed and less lateral trunk lean when ambulating each session. She also continues to respond well to manual therapy with report of decreased tightness post-tx. Able to increase reps for supine clams, which demonstrates an improvement in proximal endurance. Also able to perform TA pball press in a standing position, which further demonstrates good progress toward her goals. Patient tolerated treatment well and completed program without pain. Patient was educated that she may ease into pool exercise routine as tolerated but was educated to hold from lap swimming until pain levels further subside with transfers. Patient would benefit from continued PT to progress treatment as appropriate to maximize function.       Plan: Continue per plan of care.      Precautions: s/p R tibial bone biopsy (23), osteoporosis, hx of R tibial stress fracture , hx of R distal tib/fib stress fracture     *ADHESIVE ALLERGY       HEP: LTR, SKTC  Manuals 1/10 1/13 1/16 1/21 1/24        STM R lumbar paraspinals NV KP KP KP (use prone pillow) KP (use prone pillow)                                               Neuro Re-Ed                                                                                                        Ther Ex     "         LTR 5\"x10 b/l HEP 5\"x10 b/l 5\"x10 b/l 5\"x10 b/l 5\"x10 b/l        SKTC 5\"x10 HEP 10\"x10 R 10\"x10 R 10\"x10 R 10\"x10 R        TA activation with pball NV 3\"x20 supine 3\"x20 supine 3\"x20 supine 3\"x20 stand        Supine clams NV RTB 5\"x20 RTB 5\"x20 RTB 3\"x25 RTB 5\"x30 RTB        Supine hip ADD isometrics NV 5\"x20 5\"x20 5\"x30 5\"x30        Seated pball rollouts  NV 5\"x10 fwd 5\"x10 fwd 10\"x10 fwd 10\"x10 fwd        Lateral shift self-correction at wall NV 5\"x10 R shoulder at wall 5\"x10 (x2) R shoulder at wall 5\"x10 (x2) R shoulder at wall 5\"x10 (x2) R shoulder at wall                                  Ther Activity                                       Gait Training                                       Modalities             MHP lumbar  X10' seated X10' seated pre tx X10' seated pre tx X10' seated pre tx X10' seated pre tx                                "

## 2025-01-27 ENCOUNTER — OFFICE VISIT (OUTPATIENT)
Dept: PHYSICAL THERAPY | Facility: MEDICAL CENTER | Age: 70
End: 2025-01-27
Payer: MEDICARE

## 2025-01-27 DIAGNOSIS — M54.50 ACUTE RIGHT-SIDED LOW BACK PAIN WITHOUT SCIATICA: ICD-10-CM

## 2025-01-27 DIAGNOSIS — M25.551 RIGHT HIP PAIN: Primary | ICD-10-CM

## 2025-01-27 PROCEDURE — 97110 THERAPEUTIC EXERCISES: CPT | Performed by: PHYSICAL THERAPIST

## 2025-01-27 PROCEDURE — 97140 MANUAL THERAPY 1/> REGIONS: CPT | Performed by: PHYSICAL THERAPIST

## 2025-01-27 NOTE — PROGRESS NOTES
"Daily Note     Today's date: 2025  Patient name: Silvina Jay  : 1955  MRN: [id number]  Referring provider: Talon Rae Jr., MD  Dx:   Encounter Diagnosis     ICD-10-CM    1. Right hip pain  M25.551       2. Acute right-sided low back pain without sciatica  M54.50                      Subjective: Patient reports that she is continuing to improve overall. She has some soreness in the R side of her low back after riding her recumbent bike yesterday, but her walking is continuing to improve.      Objective: See treatment diary below      Assessment: Patient continues to demonstrate improved quality of gait mechanics each session with less lateral trunk lean present. Continued with manual therapy to address soft tissue tightness in R lumbar paraspinals, and patient tolerated manual therapy well. Continued with lumbopelvic mobility and core/hip girdle stability program. Held progressions due to baseline soreness. Patient tolerated treatment well and completed program in a pain-free range. She was educated to rest from recumbent biking until at least tomorrow until soreness subsides. Patient would benefit from continued PT to further progress treatment as tolerated to achieve goals.      Plan: Continue per plan of care.      Precautions: s/p R tibial bone biopsy (23), osteoporosis, hx of R tibial stress fracture , hx of R distal tib/fib stress fracture     *ADHESIVE ALLERGY       HEP: LTR, SKTC  Manuals 1/10 1/13 1/16 1/21 1/24 1/27       STM R lumbar paraspinals NV KP KP KP (use prone pillow) KP (use prone pillow) KP (use prone pillow)                                              Neuro Re-Ed                                                                                                        Ther Ex             LTR 5\"x10 b/l HEP 5\"x10 b/l 5\"x10 b/l 5\"x10 b/l 5\"x10 b/l 5\"x10 b/l       SKTC 5\"x10 HEP 10\"x10 R 10\"x10 R 10\"x10 R 10\"x10 R 10\"x10 R       TA activation with pball NV 3\"x20 " "supine 3\"x20 supine 3\"x20 supine 3\"x20 stand 3\"x20 stand       Supine clams NV RTB 5\"x20 RTB 5\"x20 RTB 3\"x25 RTB 5\"x30 RTB 5\"x30 RTB       Supine hip ADD isometrics NV 5\"x20 5\"x20 5\"x30 5\"x30 5\"x30       Seated pball rollouts  NV 5\"x10 fwd 5\"x10 fwd 10\"x10 fwd 10\"x10 fwd 10\"x10 fwd       Lateral shift self-correction at wall NV 5\"x10 R shoulder at wall 5\"x10 (x2) R shoulder at wall 5\"x10 (x2) R shoulder at wall 5\"x10 (x2) R shoulder at wall 5\"x10 (x2) R shoulder at wall                                 Ther Activity                                       Gait Training                                       Modalities             MHP lumbar  X10' seated X10' seated pre tx X10' seated pre tx X10' seated pre tx X10' seated pre tx X10' seated pre tx                                 "

## 2025-01-27 NOTE — PROGRESS NOTES
Addended by: GONSALO JOSEPH on: 1/27/2025 12:37 PM     Modules accepted: Orders     Discharge Summary    Today's date: 2024  Patient name: Silvina Jay  : 1955  MRN: [id number]  Referring provider: Talon Rae Jr., MD  Dx:   Encounter Diagnosis     ICD-10-CM    1. Chronic right shoulder pain  M25.511     G89.29       2. Medial tibial stress syndrome, right, sequela  S86.891S       3. 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 She is able to walk longer distances compared to her first visit, and she is having less pain in her ankle since purchasing new sneakers. She also notes that she is having less shoulder pain when reaching and taking shirts on and off. She is also able to swim with less pain compared to previously. She did not attempt returning to tennis yet, but she feels ready to be discharged to her home program.      Objective: See treatment diary below    R ankle AROM: WFL all planes    R ankle strength: 5/5 all planes    Shoulder AROM:  Elevation: L: 175 deg, R: 175 deg  BITA BTH: L: C7, R: C7  FIR BTB: L: T9, R: T9      Shoulder PROM:  FLX: L: 175 deg, R: 175 deg  IR at 90: L: 65 deg, R: 60 deg    Scapular strength:  Middle trapezius: L: 4+/5, R: 4+/5  Lower trapezius: L: 4/5, R: 4+/5    Shoulder strength:  5/5 all planes bilaterally    Assessment: Patient has demonstrated good progress with improving R ankle AROM and strength in all planes over the last few months, which has improved her ability to ambulate compared to her first session. In addition, she is independent with an illustrated HEP for continued ankle mobility. Patient has also demonstrated steady progress with improving R shoulder IR AROM behind the back over the last few weeks, which has improved her ability to don/doff clothing and reach with less pain. In addition, she has demonstrated improvements in the strength of her scapular stabilizers, which  has reduced the stress on her shoulder girdle when swimming. Patient responded well to manual interventions today with a further improvement in R shoulder IR PROM post-tx compared to presentation. Patient has met all of her goals for PT except for returning to tennis because she did not attempt yet. However, she is independent in a comprehensive illustrated HEP for continued ankle mobility, shoulder girdle mobility, and scapular stability. Patient is agreeable to be discharged to her HEP.     Goals  STG:  Patient will be independent with home exercise program.- met   Patient will demonstrate decreased swelling in R anterior ankle to improve mobility for ambulation.- met  LTG:  Patient will increase R ankle DF AROM to at least 5-10 deg to improve quality of gait mechanics.- met  Patient will increase R ankle strength to at least 4+/5 in all planes to be able to stand for longer periods.- met  Patient will be able to ambulate longer distances in the community.- met  Patient will increase R shoulder FIR BTB to be comparable to the contralateral side to improve swimming mechanics.- met  Patient will increase strength of R scapular stabilizers by at least 1-2 grades via MMT to reduce stress on R shoulder girdle during ADL.- met  Patient will be able to participate in tennis with modifications as necessary.- not met (did not attempt)  Patient will be able to return to swimming.- met  Patient will be able to manage symptoms independently.- met    Plan: Discharge to HEP.      Precautions: s/p R tibial bone biopsy (5/8/23), osteoporosis, hx of R tibial stress fracture 2015, hx of R distal tib/fib stress fracture 2018    *Potential adhesive allergy        Manuals 12/4 12/7 12/14 12/19 12/21 12/26 12/28 1/2 1/4 1/9 1/11   R shoulder IR PROM KP KP + FLX KP + FLX KP + FLX KP + FLX KP + FLX KP + FLX KP + FLX KP  KP KP   Re-Evaluation KP          TED   Neuro Re-Ed                                          Ther Ex              Rec  "bike              Table slides 5\"x20 FLX HEP 5\"x20 FLX 5\"x20 FLX 5\"x20 scapt 10\"x20 scapt 10\"x20 scapt incline 10\"x20 scapt incline 10\"x20 scapt incline 10\"x20 scapt incline 10\"x20 scapt incline 10\"x20 scapt incline   Supine self-shoulder FLX AAROM NV 5\"x20 5\"x20 10\"x20 10\"x20 10\"x20 10\"x20 10\"x20 10\"x20 10\"x20 HEP   Supine cross body stretch   30\"x3 30\"x3 30\"x4 30\"x4 30\"x4 30\"x4 30\"x4 30\"x4 30\"x4   Supine serratus punches   2x10 2x10 2x15 3x12 3x15 3x15 2x10 1# 3x10 1# 3x10 1#   Pulleys NV 5' 5' 5' 5' 5' 5' 5' 5' 5' 5'   Scap 4 2x10 ea HEP 2x10 ea 2x10 ea YTB 2x10 ea no band 2x15 ea no band 3x12 ea no band 3x15 no band 3x15 no band 3x15 no band 3x15 no band 3x15 no band   HEP education and instruction x15'             Ther Activity                                          Gait Training                                          Modalities              MHP R shoulder X10' post tx X15' pre tx X10' pre tx X10' pre tx X10' pre tx X10' pre tx X10' pre tx X10' pre tx X10' pre tx X10' pre tx X10' pre tx                                                      "

## 2025-01-30 ENCOUNTER — OFFICE VISIT (OUTPATIENT)
Dept: PHYSICAL THERAPY | Facility: MEDICAL CENTER | Age: 70
End: 2025-01-30
Payer: MEDICARE

## 2025-01-30 DIAGNOSIS — M54.50 ACUTE RIGHT-SIDED LOW BACK PAIN WITHOUT SCIATICA: ICD-10-CM

## 2025-01-30 DIAGNOSIS — M25.551 RIGHT HIP PAIN: Primary | ICD-10-CM

## 2025-01-30 PROCEDURE — 97140 MANUAL THERAPY 1/> REGIONS: CPT | Performed by: PHYSICAL THERAPIST

## 2025-01-30 PROCEDURE — 97110 THERAPEUTIC EXERCISES: CPT | Performed by: PHYSICAL THERAPIST

## 2025-01-30 NOTE — PROGRESS NOTES
Daily Note     Today's date: 2025  Patient name: Silvina Jay  : 1955  MRN: [id number]  Referring provider: Talon Rae Jr., MD  Dx:   Encounter Diagnosis     ICD-10-CM    1. Right hip pain  M25.551       2. Acute right-sided low back pain without sciatica  M54.50                      Subjective: Patient reports that she went to the gym 2 days ago and was able to go on the recumbent bike for 15 minutes. She then went on the treadmill, and she developed pain in the R side of her low back after 10 minutes. She had some soreness that lasted through yesterday, but she is feeling improved by today. Overall, she is noticing improvements since beginning PT, and she is able to do more of her daily activities.      Objective: See treatment diary below      Assessment: Patient continues to ambulate with increased gait speed each session and demonstrates increased speed during transfers. Continued with manual interventions to address soft tissue tightness, and patient responded well to manual therapy with report of alleviation of symptoms post-tx. Performed TA activation with pball in supine and held progressions due to recent soreness. Held remaining reps for self-lateral shift correction at the wall due to R hip discomfort after 15 reps. Discomfort resolved after this exercise. Patient tolerated treatment well and completed program in a pain-free range. Patient would benefit from continued PT to further progress treatment as appropriate to achieve goals.      Plan: Continue per plan of care.      Precautions: s/p R tibial bone biopsy (23), osteoporosis, hx of R tibial stress fracture , hx of R distal tib/fib stress fracture     *ADHESIVE ALLERGY       HEP: LTR, SKTC  Manuals 1/10 1/13 1/16 1/21 1/24 1/27 1/30      STM R lumbar paraspinals NV KP KP KP (use prone pillow) KP (use prone pillow) KP (use prone pillow) KP (use prone pillow)                                             Neuro Re-Ed    "                                                                                                     Ther Ex             LTR 5\"x10 b/l HEP 5\"x10 b/l 5\"x10 b/l 5\"x10 b/l 5\"x10 b/l 5\"x10 b/l 5\"x10 b/l      SKTC 5\"x10 HEP 10\"x10 R 10\"x10 R 10\"x10 R 10\"x10 R 10\"x10 R 10\"x10      TA activation with pball NV 3\"x20 supine 3\"x20 supine 3\"x20 supine 3\"x20 stand 3\"x20 stand 3\"x20 supine      Supine clams NV RTB 5\"x20 RTB 5\"x20 RTB 3\"x25 RTB 5\"x30 RTB 5\"x30 RTB 5\"x30 RTB      Supine hip ADD isometrics NV 5\"x20 5\"x20 5\"x30 5\"x30 5\"x30 5\"x30      Seated pball rollouts  NV 5\"x10 fwd 5\"x10 fwd 10\"x10 fwd 10\"x10 fwd 10\"x10 fwd 10\"x10 fwd      Lateral shift self-correction at wall NV 5\"x10 R shoulder at wall 5\"x10 (x2) R shoulder at wall 5\"x10 (x2) R shoulder at wall 5\"x10 (x2) R shoulder at wall 5\"x10 (x2) R shoulder at wall 5\"x15R shoulder at wall                                Ther Activity                                       Gait Training                                       Modalities             MHP lumbar  X10' seated X10' seated pre tx X10' seated pre tx X10' seated pre tx X10' seated pre tx X10' seated pre tx X10' seated pre tx                                  "

## 2025-02-03 ENCOUNTER — OFFICE VISIT (OUTPATIENT)
Dept: PHYSICAL THERAPY | Facility: MEDICAL CENTER | Age: 70
End: 2025-02-03
Payer: MEDICARE

## 2025-02-03 DIAGNOSIS — M25.551 RIGHT HIP PAIN: Primary | ICD-10-CM

## 2025-02-03 DIAGNOSIS — M54.50 ACUTE RIGHT-SIDED LOW BACK PAIN WITHOUT SCIATICA: ICD-10-CM

## 2025-02-03 PROCEDURE — 97110 THERAPEUTIC EXERCISES: CPT | Performed by: PHYSICAL THERAPIST

## 2025-02-03 PROCEDURE — 97140 MANUAL THERAPY 1/> REGIONS: CPT | Performed by: PHYSICAL THERAPIST

## 2025-02-03 NOTE — PROGRESS NOTES
"Daily Note     Today's date: 2/3/2025  Patient name: Silvina Jay  : 1955  MRN: [id number]  Referring provider: Talon Rae Jr., MD  Dx:   Encounter Diagnosis     ICD-10-CM    1. Right hip pain  M25.551       2. Acute right-sided low back pain without sciatica  M54.50                      Subjective: Patient reports that she is feeling better with less pain in the R side of her back and hip.      Objective: See treatment diary below      Assessment: Continued with manual interventions to address mild remaining soft tissue tightness in R lumbar paraspinals, and patient responded well to manual therapy with report of decreased tightness post-tx. Able to increase resistance for supine clams, which demonstrates good progress. Patient tolerated treatment well and completed program without pain. Patient would benefit from continued PT to progress treatment as appropriate to facilitate a full return to active lifestyle.      Plan: Continue per plan of care.      Precautions: s/p R tibial bone biopsy (23), osteoporosis, hx of R tibial stress fracture , hx of R distal tib/fib stress fracture     *ADHESIVE ALLERGY       HEP: LTR, SKTC  Manuals 1/10 1/13 1/16 1/21 1/24 1/27 1/30 2/3     STM R lumbar paraspinals NV KP KP KP (use prone pillow) KP (use prone pillow) KP (use prone pillow) KP (use prone pillow) KP (use prone pillow)                                            Neuro Re-Ed                                                                                                        Ther Ex             LTR 5\"x10 b/l HEP 5\"x10 b/l 5\"x10 b/l 5\"x10 b/l 5\"x10 b/l 5\"x10 b/l 5\"x10 b/l 5\"x10 b/l     SKTC 5\"x10 HEP 10\"x10 R 10\"x10 R 10\"x10 R 10\"x10 R 10\"x10 R 10\"x10 10\"x10     TA activation with pball NV 3\"x20 supine 3\"x20 supine 3\"x20 supine 3\"x20 stand 3\"x20 stand 3\"x20 supine 3\"x20 stand     Supine clams NV RTB 5\"x20 RTB 5\"x20 RTB 3\"x25 RTB 5\"x30 RTB 5\"x30 RTB 5\"x30 RTB 5\"x20 GTB     Supine hip ADD " "isometrics NV 5\"x20 5\"x20 5\"x30 5\"x30 5\"x30 5\"x30 5\"x30     Seated pball rollouts  NV 5\"x10 fwd 5\"x10 fwd 10\"x10 fwd 10\"x10 fwd 10\"x10 fwd 10\"x10 fwd 10\"x10 fwd     Lateral shift self-correction at wall NV 5\"x10 R shoulder at wall 5\"x10 (x2) R shoulder at wall 5\"x10 (x2) R shoulder at wall 5\"x10 (x2) R shoulder at wall 5\"x10 (x2) R shoulder at wall 5\"x15R shoulder at wall 5\"x20 R shoulder at wall                               Ther Activity                                       Gait Training                                       Modalities             MHP lumbar  X10' seated X10' seated pre tx X10' seated pre tx X10' seated pre tx X10' seated pre tx X10' seated pre tx X10' seated pre tx X10' seated pre tx                                   "

## 2025-02-06 ENCOUNTER — APPOINTMENT (OUTPATIENT)
Dept: PHYSICAL THERAPY | Facility: MEDICAL CENTER | Age: 70
End: 2025-02-06
Payer: MEDICARE

## 2025-02-07 ENCOUNTER — OFFICE VISIT (OUTPATIENT)
Dept: PHYSICAL THERAPY | Facility: MEDICAL CENTER | Age: 70
End: 2025-02-07
Payer: MEDICARE

## 2025-02-07 DIAGNOSIS — M54.50 ACUTE RIGHT-SIDED LOW BACK PAIN WITHOUT SCIATICA: ICD-10-CM

## 2025-02-07 DIAGNOSIS — M25.551 RIGHT HIP PAIN: Primary | ICD-10-CM

## 2025-02-07 PROCEDURE — 97140 MANUAL THERAPY 1/> REGIONS: CPT | Performed by: PHYSICAL THERAPIST

## 2025-02-07 PROCEDURE — 97110 THERAPEUTIC EXERCISES: CPT | Performed by: PHYSICAL THERAPIST

## 2025-02-07 NOTE — LETTER
2025    Talon Rae Jr., MD  [address] [place] [address] [address]  [facility] [address]    Patient: Silvina Jay   YOB: 1955   Date of Visit: 2025     Encounter Diagnosis     ICD-10-CM    1. Right hip pain  M25.551       2. Acute right-sided low back pain without sciatica  M54.50           Dear Dr. Rae:    Thank you for your recent referral of Silvina Jay. Please review the attached evaluation summary from Silvina's recent visit.     Please verify that you agree with the plan of care by signing the attached order.     If you have any questions or concerns, please do not hesitate to call.     I sincerely appreciate the opportunity to share in the care of one of your patients and hope to have another opportunity to work with you in the near future.       Sincerely,    Cristy Hendrickson, PT      Referring Provider:      I certify that I have read the below Plan of Care and certify the need for these services furnished under this plan of treatment while under my care.                    Talon Rae Jr., MD  [address] [place] [address] [address]  [facility] [address]  Via Fax: [phone]          PT Re-Evaluation    Today's date: 2025  Patient name: Silvina Jay  : 1955  MRN: [id number]  Referring provider: Talon Rae Jr., MD  Dx:   Encounter Diagnosis     ICD-10-CM    1. Right hip pain  M25.551       2. Acute right-sided low back pain without sciatica  M54.50                      Subjective: Patient reports that she is noticing improvements with less intense pain in her low back and hip. She is able to roll in bed and transfer into and out of the car with less difficulty. She is also able to walk with less pain. In addition, she was able to ride her recumbent bike for 25 minutes a few days ago, which is a significant improvement compared to previously. She is pleased with her overall progress and would like to work on her ability to walk long distances. She also  desires to return to swimming.      Objective: See treatment diary below    Palpation    Right   Hypertonic in the erector spinae, lumbar paraspinals and quadratus lumborum.   Tenderness of the erector spinae, lumbar paraspinals and quadratus lumborum.      Active Range of Motion   Lumbar   Flexion: minimal restrictions  Extension: minimal restrictions  Left rotation: minimal/moderate restrictions  Right rotation: minimal/moderate restrictions      Strength   Left Hip   Flexion: 4+/5  Abduction: 4/5    Right Hip   Flexion: 4+/5  Abduction: 4-/5      Muscle Activation   Additional Muscle Activation Details  Difficulty engaging bilateral TA/multifidus    General Observation  Mild shifting of UE to the L when ambulating (significantly less shifting noted compared to IE)    Assessment: Patient has demonstrated good progress with reducing the soft tissue tension in her R lumbar paraspinals/erector spinae over the last few weeks, which has improved her ability to perform transfers. She has also demonstrated improvements in her lumbar rotational mobility, which has facilitated an improvement in her ability to roll in bed. Although improved significantly, mild remaining soft tissue tension in R lumbar paraspinals/erector spinae, mild remaining limitations in end-range lumbar rotational mobility, and concurrent weakness in bilateral hip girdle and core musculature limit her ability to ambulate long community distances and participate in swimming to her prior capacity. Patient is demonstrating good overall progress toward her goals. She tolerated all treatment well today and completed program without pain. Patient would benefit from continued PT to further improve soft tissue mobility, end-range lumbar ROM, and core/hip girdle strength to facilitate a full return to active lifestyle.       Goals  STG:  Patient will be independent with home exercise program.- met   Patient will demonstrate at least 25%-50% reduction in  "palpable soft tissue tightness in R lumbar paraspinals/erector spinae to improve ability to perform transfers.- met  LTG:  Patient will increase L lumbar ROT AROM to be comparable to the contralateral side to be able to roll in bed.- in progress (improved)  Patient will increase R hip strength to be at least 4+/5 in all planes to be able to ambulate longer distances.- in progress (improved)  Patient will be able to transfer into and out of the car.- in progress (improved)  Patient will be able to swim.- in progress  Patient will be able to manage symptoms independently.- in progress        Plan: 2x/week tapering to 1x/week for 3 additional weeks after this week.     Precautions: s/p R tibial bone biopsy (5/8/23), osteoporosis, hx of R tibial stress fracture 2015, hx of R distal tib/fib stress fracture 2018    *ADHESIVE ALLERGY       HEP: LTR, SKTC  Manuals 1/10 1/13 1/16 1/21 1/24 1/27 1/30 2/3 2/7    STM R lumbar paraspinals NV KP KP KP (use prone pillow) KP (use prone pillow) KP (use prone pillow) KP (use prone pillow) KP (use prone pillow) KP (use prone pillow)                                           Neuro Re-Ed                                                                                                        Ther Ex             LTR 5\"x10 b/l HEP 5\"x10 b/l 5\"x10 b/l 5\"x10 b/l 5\"x10 b/l 5\"x10 b/l 5\"x10 b/l 5\"x10 b/l 5\"x10 b/l    SKTC 5\"x10 HEP 10\"x10 R 10\"x10 R 10\"x10 R 10\"x10 R 10\"x10 R 10\"x10 10\"x10 10\"x10    TA activation with pball NV 3\"x20 supine 3\"x20 supine 3\"x20 supine 3\"x20 stand 3\"x20 stand 3\"x20 supine 3\"x20 stand 3\"x20 stand    Supine clams NV RTB 5\"x20 RTB 5\"x20 RTB 3\"x25 RTB 5\"x30 RTB 5\"x30 RTB 5\"x30 RTB 5\"x20 GTB 5\"x30 GTB    Supine hip ADD isometrics NV 5\"x20 5\"x20 5\"x30 5\"x30 5\"x30 5\"x30 5\"x30 5\"x30    Seated pball rollouts  NV 5\"x10 fwd 5\"x10 fwd 10\"x10 fwd 10\"x10 fwd 10\"x10 fwd 10\"x10 fwd 10\"x10 fwd 10\"x10 fwd    Lateral shift self-correction at wall NV 5\"x10 R shoulder at wall 5\"x10 (x2) " "R shoulder at wall 5\"x10 (x2) R shoulder at wall 5\"x10 (x2) R shoulder at wall 5\"x10 (x2) R shoulder at wall 5\"x15R shoulder at wall 5\"x20 R shoulder at wall 5\"x20 R shoulder at wall                              Ther Activity                                       Gait Training                                       Modalities             MHP lumbar  X10' seated X10' seated pre tx X10' seated pre tx X10' seated pre tx X10' seated pre tx X10' seated pre tx X10' seated pre tx X10' seated pre tx X10' seated pre tx                                                    "

## 2025-02-07 NOTE — PROGRESS NOTES
PT Re-Evaluation    Today's date: 2025  Patient name: Silvina Jay  : 1955  MRN: [id number]  Referring provider: Talon Rae Jr., MD  Dx:   Encounter Diagnosis     ICD-10-CM    1. Right hip pain  M25.551       2. Acute right-sided low back pain without sciatica  M54.50                      Subjective: Patient reports that she is noticing improvements with less intense pain in her low back and hip. She is able to roll in bed and transfer into and out of the car with less difficulty. She is also able to walk with less pain. In addition, she was able to ride her recumbent bike for 25 minutes a few days ago, which is a significant improvement compared to previously. She is pleased with her overall progress and would like to work on her ability to walk long distances. She also desires to return to swimming.      Objective: See treatment diary below    Palpation    Right   Hypertonic in the erector spinae, lumbar paraspinals and quadratus lumborum.   Tenderness of the erector spinae, lumbar paraspinals and quadratus lumborum.      Active Range of Motion   Lumbar   Flexion: minimal restrictions  Extension: minimal restrictions  Left rotation: minimal/moderate restrictions  Right rotation: minimal/moderate restrictions      Strength   Left Hip   Flexion: 4+/5  Abduction: 4/5    Right Hip   Flexion: 4+/5  Abduction: 4-/5      Muscle Activation   Additional Muscle Activation Details  Difficulty engaging bilateral TA/multifidus    General Observation  Mild shifting of UE to the L when ambulating (significantly less shifting noted compared to IE)    Assessment: Patient has demonstrated good progress with reducing the soft tissue tension in her R lumbar paraspinals/erector spinae over the last few weeks, which has improved her ability to perform transfers. She has also demonstrated improvements in her lumbar rotational mobility, which has facilitated an improvement in her ability to roll in bed. Although  improved significantly, mild remaining soft tissue tension in R lumbar paraspinals/erector spinae, mild remaining limitations in end-range lumbar rotational mobility, and concurrent weakness in bilateral hip girdle and core musculature limit her ability to ambulate long community distances and participate in swimming to her prior capacity. Patient is demonstrating good overall progress toward her goals. She tolerated all treatment well today and completed program without pain. Patient would benefit from continued PT to further improve soft tissue mobility, end-range lumbar ROM, and core/hip girdle strength to facilitate a full return to active lifestyle.       Goals  STG:  Patient will be independent with home exercise program.- met   Patient will demonstrate at least 25%-50% reduction in palpable soft tissue tightness in R lumbar paraspinals/erector spinae to improve ability to perform transfers.- met  LTG:  Patient will increase L lumbar ROT AROM to be comparable to the contralateral side to be able to roll in bed.- in progress (improved)  Patient will increase R hip strength to be at least 4+/5 in all planes to be able to ambulate longer distances.- in progress (improved)  Patient will be able to transfer into and out of the car.- in progress (improved)  Patient will be able to swim.- in progress  Patient will be able to manage symptoms independently.- in progress        Plan: 2x/week tapering to 1x/week for 3 additional weeks after this week.     Precautions: s/p R tibial bone biopsy (5/8/23), osteoporosis, hx of R tibial stress fracture 2015, hx of R distal tib/fib stress fracture 2018    *ADHESIVE ALLERGY       HEP: LTR, SKTC  Manuals 1/10 1/13 1/16 1/21 1/24 1/27 1/30 2/3 2/7    STM R lumbar paraspinals NV KP KP KP (use prone pillow) KP (use prone pillow) KP (use prone pillow) KP (use prone pillow) KP (use prone pillow) KP (use prone pillow)                                           Neuro Re-Ed                 "                                                                                        Ther Ex             LTR 5\"x10 b/l HEP 5\"x10 b/l 5\"x10 b/l 5\"x10 b/l 5\"x10 b/l 5\"x10 b/l 5\"x10 b/l 5\"x10 b/l 5\"x10 b/l    SKTC 5\"x10 HEP 10\"x10 R 10\"x10 R 10\"x10 R 10\"x10 R 10\"x10 R 10\"x10 10\"x10 10\"x10    TA activation with pball NV 3\"x20 supine 3\"x20 supine 3\"x20 supine 3\"x20 stand 3\"x20 stand 3\"x20 supine 3\"x20 stand 3\"x20 stand    Supine clams NV RTB 5\"x20 RTB 5\"x20 RTB 3\"x25 RTB 5\"x30 RTB 5\"x30 RTB 5\"x30 RTB 5\"x20 GTB 5\"x30 GTB    Supine hip ADD isometrics NV 5\"x20 5\"x20 5\"x30 5\"x30 5\"x30 5\"x30 5\"x30 5\"x30    Seated pball rollouts  NV 5\"x10 fwd 5\"x10 fwd 10\"x10 fwd 10\"x10 fwd 10\"x10 fwd 10\"x10 fwd 10\"x10 fwd 10\"x10 fwd    Lateral shift self-correction at wall NV 5\"x10 R shoulder at wall 5\"x10 (x2) R shoulder at wall 5\"x10 (x2) R shoulder at wall 5\"x10 (x2) R shoulder at wall 5\"x10 (x2) R shoulder at wall 5\"x15R shoulder at wall 5\"x20 R shoulder at wall 5\"x20 R shoulder at wall                              Ther Activity                                       Gait Training                                       Modalities             MHP lumbar  X10' seated X10' seated pre tx X10' seated pre tx X10' seated pre tx X10' seated pre tx X10' seated pre tx X10' seated pre tx X10' seated pre tx X10' seated pre tx                                    "

## 2025-02-10 ENCOUNTER — OFFICE VISIT (OUTPATIENT)
Dept: PHYSICAL THERAPY | Facility: MEDICAL CENTER | Age: 70
End: 2025-02-10
Payer: MEDICARE

## 2025-02-10 DIAGNOSIS — M54.50 ACUTE RIGHT-SIDED LOW BACK PAIN WITHOUT SCIATICA: ICD-10-CM

## 2025-02-10 DIAGNOSIS — M25.551 RIGHT HIP PAIN: Primary | ICD-10-CM

## 2025-02-10 PROCEDURE — 97110 THERAPEUTIC EXERCISES: CPT | Performed by: PHYSICAL THERAPIST

## 2025-02-10 PROCEDURE — 97140 MANUAL THERAPY 1/> REGIONS: CPT | Performed by: PHYSICAL THERAPIST

## 2025-02-10 NOTE — PROGRESS NOTES
Daily Note     Today's date: 2/10/2025  Patient name: Silvina Jay  : 1955  MRN: [id number]  Referring provider: Talon Rae Jr., MD  Dx:   Encounter Diagnosis     ICD-10-CM    1. Right hip pain  M25.551       2. Acute right-sided low back pain without sciatica  M54.50                      Subjective: Patient reports that she is noticing significant improvements with less pain in her back and hip. She is able to get into and out of cars and chairs with less difficulty. She was also able to do some swimming 2 days ago, and she did not have any pain when swimming or after swimming. She had some mild discomfort over the past weekend when sitting on the floor to assemble a stool, but this resolved shortly. She is very pleased with her overall progress.       Objective: See treatment diary below      Assessment: Continued with manual interventions to address mild soft tissue tightness in R lumbar paraspinals, and patient tolerated manual therapy well. She is consistently demonstrating decreased palpable soft tissue tension each session. Able to increase reps for TA pball press, which demonstrates an improvement in core strength. Patient tolerated treatment well and completed program without pain. Patient would benefit from continued PT to further improve mobility and strength to facilitate a full return to active lifestyle.      Plan: Continue per plan of care.      Precautions: s/p R tibial bone biopsy (23), osteoporosis, hx of R tibial stress fracture , hx of R distal tib/fib stress fracture     *ADHESIVE ALLERGY       HEP: LTR, SKTC  Manuals 1/10 1/13 1/16 1/21 1/24 1/27 1/30 2/3 2/7 2/10   STM R lumbar paraspinals NV KP KP KP (use prone pillow) KP (use prone pillow) KP (use prone pillow) KP (use prone pillow) KP (use prone pillow) KP (use prone pillow) KP (use prone pillow)                                          Neuro Re-Ed                                                                "                                         Ther Ex             LTR 5\"x10 b/l HEP 5\"x10 b/l 5\"x10 b/l 5\"x10 b/l 5\"x10 b/l 5\"x10 b/l 5\"x10 b/l 5\"x10 b/l 5\"x10 b/l 5\"x10 b/l   SKTC 5\"x10 HEP 10\"x10 R 10\"x10 R 10\"x10 R 10\"x10 R 10\"x10 R 10\"x10 10\"x10 10\"x10 10\"x10   TA activation with pball NV 3\"x20 supine 3\"x20 supine 3\"x20 supine 3\"x20 stand 3\"x20 stand 3\"x20 supine 3\"x20 stand 3\"x20 stand 3\"x30 stand   Supine clams NV RTB 5\"x20 RTB 5\"x20 RTB 3\"x25 RTB 5\"x30 RTB 5\"x30 RTB 5\"x30 RTB 5\"x20 GTB 5\"x30 GTB 5\"x30 GTB   Supine hip ADD isometrics NV 5\"x20 5\"x20 5\"x30 5\"x30 5\"x30 5\"x30 5\"x30 5\"x30 5\"x30   Seated pball rollouts  NV 5\"x10 fwd 5\"x10 fwd 10\"x10 fwd 10\"x10 fwd 10\"x10 fwd 10\"x10 fwd 10\"x10 fwd 10\"x10 fwd 10\"x10 fwd   Lateral shift self-correction at wall NV 5\"x10 R shoulder at wall 5\"x10 (x2) R shoulder at wall 5\"x10 (x2) R shoulder at wall 5\"x10 (x2) R shoulder at wall 5\"x10 (x2) R shoulder at wall 5\"x15R shoulder at wall 5\"x20 R shoulder at wall 5\"x20 R shoulder at wall 5\"x20 R shoulder at wall                             Ther Activity                                       Gait Training                                       Modalities             MHP lumbar  X10' seated X10' seated pre tx X10' seated pre tx X10' seated pre tx X10' seated pre tx X10' seated pre tx X10' seated pre tx X10' seated pre tx X10' seated pre tx X10' seated pre tx                                     "

## 2025-02-13 ENCOUNTER — OFFICE VISIT (OUTPATIENT)
Dept: PHYSICAL THERAPY | Facility: MEDICAL CENTER | Age: 70
End: 2025-02-13
Payer: MEDICARE

## 2025-02-13 DIAGNOSIS — M25.551 RIGHT HIP PAIN: Primary | ICD-10-CM

## 2025-02-13 DIAGNOSIS — M54.50 ACUTE RIGHT-SIDED LOW BACK PAIN WITHOUT SCIATICA: ICD-10-CM

## 2025-02-13 PROCEDURE — 97110 THERAPEUTIC EXERCISES: CPT | Performed by: PHYSICAL THERAPIST

## 2025-02-13 PROCEDURE — 97140 MANUAL THERAPY 1/> REGIONS: CPT | Performed by: PHYSICAL THERAPIST

## 2025-02-13 NOTE — PROGRESS NOTES
Daily Note     Today's date: 2025  Patient name: Silvina Jay  : 1955  MRN: [id number]  Referring provider: Talon Rae Jr., MD  Dx:   Encounter Diagnosis     ICD-10-CM    1. Right hip pain  M25.551       2. Acute right-sided low back pain without sciatica  M54.50                      Subjective: Patient reports that her hip and back are feeling significantly improved. She was able to ride her recumbent bike for 25 minutes yesterday without any difficulty. She is also able to walk in stores with less pain in her low back.      Objective: See treatment diary below      Assessment: Patient is consistently demonstrating decreased palpable soft tissue tension in R lumbar paraspinals each session. She responded well to manual therapy with report of further reduction in tightness post-tx. Able to progress reps for supine clams and supine hip ADD isometrics, which demonstrates an improvement in proximal endurance. Also able to increase reps for standing TA pball press, which further demonstrates good progress. Less lateral trunk lean present upon ambulation. Patient tolerated treatment well and completed program without pain. Patient would benefit from continued PT to further progress treatment as appropriate to facilitate a full return to active lifestyle.      Plan: Continue per plan of care.      Precautions: s/p R tibial bone biopsy (23), osteoporosis, hx of R tibial stress fracture , hx of R distal tib/fib stress fracture     *ADHESIVE ALLERGY       HEP: LTR, SKTC  Manuals 2/13 1/10 1/13 1/16 1/21 1/24 1/27 1/30 2/3 2/7 2/10   STM R lumbar paraspinals KP (use prone pillow) NV KP KP KP (use prone pillow) KP (use prone pillow) KP (use prone pillow) KP (use prone pillow) KP (use prone pillow) KP (use prone pillow) KP (use prone pillow)                                             Neuro Re-Ed                                                                                                    "             Ther Ex              LTR 5\"x10 b/l 5\"x10 b/l HEP 5\"x10 b/l 5\"x10 b/l 5\"x10 b/l 5\"x10 b/l 5\"x10 b/l 5\"x10 b/l 5\"x10 b/l 5\"x10 b/l 5\"x10 b/l   SKTC 10\"x10 b/l 5\"x10 HEP 10\"x10 R 10\"x10 R 10\"x10 R 10\"x10 R 10\"x10 R 10\"x10 10\"x10 10\"x10 10\"x10   TA activation with pball 5\"x35 stand pball NV 3\"x20 supine 3\"x20 supine 3\"x20 supine 3\"x20 stand 3\"x20 stand 3\"x20 supine 3\"x20 stand 3\"x20 stand 3\"x30 stand   Supine clams 5\"x35 GTB NV RTB 5\"x20 RTB 5\"x20 RTB 3\"x25 RTB 5\"x30 RTB 5\"x30 RTB 5\"x30 RTB 5\"x20 GTB 5\"x30 GTB 5\"x30 GTB   Supine hip ADD isometrics 5\"x35 NV 5\"x20 5\"x20 5\"x30 5\"x30 5\"x30 5\"x30 5\"x30 5\"x30 5\"x30   Seated pball rollouts  10\"x10 fwd NV 5\"x10 fwd 5\"x10 fwd 10\"x10 fwd 10\"x10 fwd 10\"x10 fwd 10\"x10 fwd 10\"x10 fwd 10\"x10 fwd 10\"x10 fwd   Lateral shift self-correction at wall 5\"x20 R shoulder at wall NV 5\"x10 R shoulder at wall 5\"x10 (x2) R shoulder at wall 5\"x10 (x2) R shoulder at wall 5\"x10 (x2) R shoulder at wall 5\"x10 (x2) R shoulder at wall 5\"x15R shoulder at wall 5\"x20 R shoulder at wall 5\"x20 R shoulder at wall 5\"x20 R shoulder at wall                               Ther Activity                                          Gait Training                                          Modalities              MHP lumbar  X10' seated pre tx X10' seated X10' seated pre tx X10' seated pre tx X10' seated pre tx X10' seated pre tx X10' seated pre tx X10' seated pre tx X10' seated pre tx X10' seated pre tx X10' seated pre tx                                        "

## 2025-02-18 ENCOUNTER — OFFICE VISIT (OUTPATIENT)
Dept: PHYSICAL THERAPY | Facility: MEDICAL CENTER | Age: 70
End: 2025-02-18
Payer: MEDICARE

## 2025-02-18 DIAGNOSIS — M54.50 ACUTE RIGHT-SIDED LOW BACK PAIN WITHOUT SCIATICA: ICD-10-CM

## 2025-02-18 DIAGNOSIS — M25.551 RIGHT HIP PAIN: Primary | ICD-10-CM

## 2025-02-18 PROCEDURE — 97110 THERAPEUTIC EXERCISES: CPT | Performed by: PHYSICAL THERAPIST

## 2025-02-18 PROCEDURE — 97140 MANUAL THERAPY 1/> REGIONS: CPT | Performed by: PHYSICAL THERAPIST

## 2025-02-18 NOTE — PROGRESS NOTES
"Daily Note     Today's date: 2025  Patient name: Silvina Jay  : 1955  MRN: [id number]  Referring provider: Talon Rae Jr., MD  Dx:   Encounter Diagnosis     ICD-10-CM    1. Right hip pain  M25.551       2. Acute right-sided low back pain without sciatica  M54.50                      Subjective: Patient reports that she has some stiffness at times in the R side of her low back and has some discomfort if standing for long periods of time. However, she is feeling significantly improved overall. She was able to swim for 25 minutes yesterday and was able to do some walking in the activity pool in between laps, and she did not have any soreness after this.       Objective: See treatment diary below      Assessment: Continued with manual interventions to address mild remaining soft tissue tightness in R lumbar paraspinals, and patient tolerated manual therapy well. Soft tissue restrictions reduced today compared to prior sessions. She is also consistently demonstrating increased gait speed and demonstrating improved ability to perform transfers each session. Able to increase resistance for supine clams, which demonstrates an improvement in proximal strength. Patient tolerated treatment well and completed program without pain. Patient would benefit from continued PT to further improve mobility and core/hip girdle strength to facilitate a full return to active lifestyle.      Plan: Continue per plan of care.      Precautions: s/p R tibial bone biopsy (23), osteoporosis, hx of R tibial stress fracture , hx of R distal tib/fib stress fracture 2018    *ADHESIVE ALLERGY       HEP: LTR, SKTC  Manuals    STM R lumbar paraspinals KP (use prone pillow) KP (use prone pillow)                  Neuro Re-Ed                                        Ther Ex     LTR 5\"x10 b/l 5\"x10 b/l   SKTC 10\"x10 b/l 10\"x10 b/l   TA activation with pball 5\"x35 stand pball 5\"x35 stand pball   Supine clams 5\"x35 GTB " "5\"x20 blue   Supine hip ADD isometrics 5\"x35 5\"x35   Seated pball rollouts  10\"x10 fwd 10\"x10 fwd   Lateral shift self-correction at wall 5\"x20 R shoulder at wall 5\"x20 R shoulder at wall             Ther Activity               Gait Training               Modalities     MHP lumbar  X10' seated pre tx X10' seated pre tx                                 "

## 2025-02-21 ENCOUNTER — OFFICE VISIT (OUTPATIENT)
Dept: PHYSICAL THERAPY | Facility: MEDICAL CENTER | Age: 70
End: 2025-02-21
Payer: MEDICARE

## 2025-02-21 DIAGNOSIS — M54.50 ACUTE RIGHT-SIDED LOW BACK PAIN WITHOUT SCIATICA: Primary | ICD-10-CM

## 2025-02-21 DIAGNOSIS — M25.551 RIGHT HIP PAIN: ICD-10-CM

## 2025-02-21 PROCEDURE — 97140 MANUAL THERAPY 1/> REGIONS: CPT | Performed by: PHYSICAL THERAPIST

## 2025-02-21 PROCEDURE — 97110 THERAPEUTIC EXERCISES: CPT | Performed by: PHYSICAL THERAPIST

## 2025-02-21 NOTE — PROGRESS NOTES
"Daily Note     Today's date: 2025  Patient name: Silvina Jay  : 1955  MRN: [id number]  Referring provider: Talon Rae Jr., MD  Dx:   Encounter Diagnosis     ICD-10-CM    1. Acute right-sided low back pain without sciatica  M54.50       2. Right hip pain  M25.551                      Subjective: Patient reports that she is continuing to notice good progress overall. She did some recumbent biking and walked on a treadmill 2 days ago. She reports that she did not have any pain in her low back after biking and walking, but she had a lot of tension in her hips. The tension resolved after sitting and resting. She also has some stiffness in her neck/shoulders today.      Objective: See treatment diary below      Assessment: Continued with STM to R lumbar paraspinals to address soft tissue tightness, and patient responded well to manual therapy with report of decreased lumbar tightness when ambulating post-tx. MHP applied to cervical spine to decrease muscular tension and to reduce compensatory stress on lumbar paraspinals. Patient reported decreased tightness after MHP. Continued with lumbopelvic mobility and core/hip girdle stability program. Decreased reps for supine hip ADD isometrics and TA pball press due to recent soreness. Patient tolerated treatment well and completed program without pain. Patient would benefit from continued PT to further address impairments to achieve goals.      Plan: Continue per plan of care.      Precautions: s/p R tibial bone biopsy (23), osteoporosis, hx of R tibial stress fracture , hx of R distal tib/fib stress fracture     *ADHESIVE ALLERGY       HEP: LTR, SKTC  Manuals    STM R lumbar paraspinals KP (use prone pillow) KP (use prone pillow) KP (use prone pillow)                     Neuro Re-Ed                                                Ther Ex      LTR 5\"x10 b/l 5\"x10 b/l 5\"x10 b/l   SKTC 10\"x10 b/l 10\"x10 b/l 10\"x10 b/l   TA " "activation with pball 5\"x35 stand pball 5\"x35 stand pball 5\"x30 stand pball   Supine clams 5\"x35 GTB 5\"x20 blue 5\"x20 blue   Supine hip ADD isometrics 5\"x35 5\"x35 5\"x30   Seated pball rollouts  10\"x10 fwd 10\"x10 fwd 10\"x10 fwd   Lateral shift self-correction at wall 5\"x20 R shoulder at wall 5\"x20 R shoulder at wall 5\"x20 R shoulder at wall               Ther Activity                  Gait Training                  Modalities      MHP lumbar  X10' seated pre tx X10' seated pre tx X15' seated pre tx + cervical                                    "

## 2025-02-25 ENCOUNTER — OFFICE VISIT (OUTPATIENT)
Dept: PHYSICAL THERAPY | Facility: MEDICAL CENTER | Age: 70
End: 2025-02-25
Payer: MEDICARE

## 2025-02-25 DIAGNOSIS — M54.50 ACUTE RIGHT-SIDED LOW BACK PAIN WITHOUT SCIATICA: Primary | ICD-10-CM

## 2025-02-25 DIAGNOSIS — M25.551 RIGHT HIP PAIN: ICD-10-CM

## 2025-02-25 PROCEDURE — 97110 THERAPEUTIC EXERCISES: CPT | Performed by: PHYSICAL THERAPIST

## 2025-02-25 PROCEDURE — 97140 MANUAL THERAPY 1/> REGIONS: CPT | Performed by: PHYSICAL THERAPIST

## 2025-02-25 NOTE — PROGRESS NOTES
"Daily Note     Today's date: 2025  Patient name: Silvina Jay  : 1955  MRN: [id number]  Referring provider: Talon Rae Jr., MD  Dx:   Encounter Diagnosis     ICD-10-CM    1. Acute right-sided low back pain without sciatica  M54.50       2. Right hip pain  M25.551                      Subjective: Patient reports that she was able to swim for 25 minutes yesterday, and she did not have any pain in her low back or hip during or after this. She reports that she had some stiffness in her mid-back after swimming, but this resolved after applying heat. She is very pleased with her overall progress thus far, and it is becoming easier to get into and out of chairs.      Objective: See treatment diary below      Assessment: Continued with manual interventions to address mild remaining soft tissue restrictions in R lumbar paraspinals, and patient responded well to manual therapy with report of decreased tightness post-tx. Continued with lumbopelvic mobility and core/hip girdle strengthening. Patient continues to demonstrate increased gait speed and increased speed of transfers each session. Patient tolerated treatment well and completed program without pain. Patient would benefit from continued PT to maximize independence with HEP prior to upcoming d/c.      Plan: Continue per plan of care. Potential d/c next visit.      Precautions: s/p R tibial bone biopsy (23), osteoporosis, hx of R tibial stress fracture , hx of R distal tib/fib stress fracture 2018    *ADHESIVE ALLERGY       HEP: LTR, SKTC  Manuals    STM R lumbar paraspinals KP (use prone pillow) KP (use prone pillow) KP (use prone pillow) KP (use prone pillow)                        Neuro Re-Ed                                                        Ther Ex       LTR 5\"x10 b/l 5\"x10 b/l 5\"x10 b/l 5\"x10 b/l   SKTC 10\"x10 b/l 10\"x10 b/l 10\"x10 b/l 10\"x10 b/l   TA activation with pball 5\"x35 stand pball 5\"x35 stand pball " "5\"x30 stand pball 5\"x30 stand pball   Supine clams 5\"x35 GTB 5\"x20 blue 5\"x20 blue 5\"x30 blue   Supine hip ADD isometrics 5\"x35 5\"x35 5\"x30 5\"x30   Seated pball rollouts  10\"x10 fwd 10\"x10 fwd 10\"x10 fwd 10\"x10 fwd   Lateral shift self-correction at wall 5\"x20 R shoulder at wall 5\"x20 R shoulder at wall 5\"x20 R shoulder at wall 5\"x20 R shoulder at wall                 Ther Activity                     Gait Training                     Modalities       MHP lumbar  X10' seated pre tx X10' seated pre tx X15' seated pre tx + cervical X10' seated pre tx lumbar                                       "

## 2025-02-28 ENCOUNTER — OFFICE VISIT (OUTPATIENT)
Dept: PHYSICAL THERAPY | Facility: MEDICAL CENTER | Age: 70
End: 2025-02-28
Payer: MEDICARE

## 2025-02-28 DIAGNOSIS — M25.551 RIGHT HIP PAIN: ICD-10-CM

## 2025-02-28 DIAGNOSIS — M54.50 ACUTE RIGHT-SIDED LOW BACK PAIN WITHOUT SCIATICA: Primary | ICD-10-CM

## 2025-02-28 PROCEDURE — 97140 MANUAL THERAPY 1/> REGIONS: CPT | Performed by: PHYSICAL THERAPIST

## 2025-02-28 PROCEDURE — 97110 THERAPEUTIC EXERCISES: CPT | Performed by: PHYSICAL THERAPIST

## 2025-02-28 NOTE — PROGRESS NOTES
Discharge Summary    Today's date: 2025  Patient name: Silvina Jay  : 1955  MRN: [id number]  Referring provider: Talon Rae Jr., MD  Dx:   Encounter Diagnosis     ICD-10-CM    1. Acute right-sided low back pain without sciatica  M54.50       2. Right hip pain  M25.551                      Subjective: Patient reports that she is feeling significantly improved. She is able to transfer into and out of chairs and her car without pain. In addition, she is able to roll in bed without difficulty. She has also returned to swimming and recumbent biking without pain in her back. She has some tightness in her hips when walking longer distances, but she is able to walk longer distances compared to previously. She is very pleased with her overall progress and feels ready to be discharged to her HEP.      Objective: See treatment diary below    Observational gait: WFL, no lateral shift noted    Hip ABD strength: 4+/5 b/l    Lumbar AROM: WFL all planes b/l    Assessment: Patient has demonstrated excellent progress with reducing the soft tissue tension in her R lumbar paraspinals and improving her lumbar AROM in all planes over the last few weeks, which has restored her ability to perform transfers and bed mobility. Patient has also demonstrated good progress with improving her core and hip girdle strength, which has reduced the compensatory stress on her lumbopelvic region when biking and swimming. Patient has met all of her goals for PT, and she is independent in an illustrated HEP for continued lumbar mobility and core/hip strengthening. Patient tolerated all treatment well today and completed program without pain. Patient is agreeable to be discharged to her HEP.     Goals  STG:  Patient will be independent with home exercise program.- met   Patient will demonstrate at least 25%-50% reduction in palpable soft tissue tightness in R lumbar paraspinals/erector spinae to improve ability to perform  "transfers.- met  LTG:  Patient will increase L lumbar ROT AROM to be comparable to the contralateral side to be able to roll in bed.- met  Patient will increase R hip strength to be at least 4+/5 in all planes to be able to ambulate longer distances.- met  Patient will be able to transfer into and out of the car.- met  Patient will be able to swim.- met  Patient will be able to manage symptoms independently.- met    Plan: Discharge to [facility].     Precautions: s/p R tibial bone biopsy (5/8/23), osteoporosis, hx of R tibial stress fracture 2015, hx of R distal tib/fib stress fracture 2018    *ADHESIVE ALLERGY     Manuals 2/13 2/18 2/21 2/25 2/28   STM R lumbar paraspinals KP (use prone pillow) KP (use prone pillow) KP (use prone pillow) KP (use prone pillow) KP (use prone pillow)                           Neuro Re-Ed                                                                Ther Ex        LTR 5\"x10 b/l 5\"x10 b/l 5\"x10 b/l 5\"x10 b/l 5\"x10 b/l   SKTC 10\"x10 b/l 10\"x10 b/l 10\"x10 b/l 10\"x10 b/l 10\"x10 b/l   TA activation with pball 5\"x35 stand pball 5\"x35 stand pball 5\"x30 stand pball 5\"x30 stand pball 5\"x30 stand pball   Supine clams 5\"x35 GTB 5\"x20 blue 5\"x20 blue 5\"x30 blue 5\"x30 blue   Supine hip ADD isometrics 5\"x35 5\"x35 5\"x30 5\"x30 5\"x30   Seated pball rollouts  10\"x10 fwd 10\"x10 fwd 10\"x10 fwd 10\"x10 fwd 10\"x10 fwd   Lateral shift self-correction at wall 5\"x20 R shoulder at wall 5\"x20 R shoulder at wall 5\"x20 R shoulder at wall 5\"x20 R shoulder at wall 5\"x20 R shoulder at wall                   Ther Activity                        Gait Training                        Modalities        MHP lumbar  X10' seated pre tx X10' seated pre tx X15' seated pre tx + cervical X10' seated pre tx lumbar X10' seated pre tx lumbar                                          "

## 2025-04-07 ENCOUNTER — OFFICE VISIT (OUTPATIENT)
Dept: NEUROLOGY | Facility: CLINIC | Age: 70
End: 2025-04-07
Payer: MEDICARE

## 2025-04-07 VITALS
TEMPERATURE: 97.8 F | OXYGEN SATURATION: 100 % | HEART RATE: 52 BPM | BODY MASS INDEX: 25.42 KG/M2 | DIASTOLIC BLOOD PRESSURE: 64 MMHG | SYSTOLIC BLOOD PRESSURE: 138 MMHG | WEIGHT: 148.1 LBS

## 2025-04-07 DIAGNOSIS — G43.109 MIGRAINE WITH AURA AND WITHOUT STATUS MIGRAINOSUS, NOT INTRACTABLE: Primary | ICD-10-CM

## 2025-04-07 PROBLEM — M89.9 LESION OF BONE OF RIGHT LOWER LEG: Status: ACTIVE | Noted: 2023-05-08

## 2025-04-07 PROBLEM — I49.8 JUNCTIONAL RHYTHM: Status: ACTIVE | Noted: 2022-06-08

## 2025-04-07 PROBLEM — E78.5 HYPERLIPIDEMIA: Status: ACTIVE | Noted: 2025-04-07

## 2025-04-07 PROBLEM — I10 ESSENTIAL HYPERTENSION: Status: ACTIVE | Noted: 2025-04-07

## 2025-04-07 PROBLEM — I47.10 SVT (SUPRAVENTRICULAR TACHYCARDIA) (HCC): Status: ACTIVE | Noted: 2022-06-08

## 2025-04-07 PROBLEM — M81.8 AGE-RELATED OSTEOPOROSIS WITHOUT FRACTURE: Status: ACTIVE | Noted: 2023-10-20

## 2025-04-07 PROBLEM — G47.33 OSA (OBSTRUCTIVE SLEEP APNEA): Status: ACTIVE | Noted: 2017-08-15

## 2025-04-07 PROBLEM — R00.2 PALPITATIONS: Status: ACTIVE | Noted: 2022-03-22

## 2025-04-07 PROBLEM — N81.10 CYSTOCELE WITH RECTOCELE: Status: ACTIVE | Noted: 2018-01-11

## 2025-04-07 PROBLEM — N81.6 CYSTOCELE WITH RECTOCELE: Status: ACTIVE | Noted: 2018-01-11

## 2025-04-07 PROBLEM — F41.9 ANXIETY: Status: ACTIVE | Noted: 2025-04-07

## 2025-04-07 PROBLEM — Z82.49 FAMILY HISTORY OF CORONARY ARTERY DISEASE: Status: ACTIVE | Noted: 2025-04-07

## 2025-04-07 PROCEDURE — 99203 OFFICE O/P NEW LOW 30 MIN: CPT

## 2025-04-07 RX ORDER — OLMESARTAN MEDOXOMIL 40 MG/1
40 TABLET ORAL DAILY
COMMUNITY

## 2025-04-07 RX ORDER — AMLODIPINE BESYLATE 10 MG/1
10 TABLET ORAL DAILY
COMMUNITY

## 2025-04-07 NOTE — PATIENT INSTRUCTIONS
"- Reports that the patient suffering from ocular migraines or migraine with aura but not having any headache associated with them.  The patient notes that these episodes have occurred twice in the last 2 months, and she did have 1 other episode about a year prior.  She will usually see lights and visions of \"flags\" in her vision, this episode usually last anywhere between 15 to 30 minutes at a time.  Advised patient that if these episodes happen more frequently or she is getting any headaches associated with them or any other neurologic symptoms that she can return and we will look into additional imaging.  Due to the infrequency of these episodes occurring and her past history of headaches that seemed related to migraines, do not believe that there is any additional imaging required at this point.  - Would like for the patient to still continue to follow with her ENT provider in regards to issues in regards to sinus congestion and nonallergic rhinitis.  Also would recommend following up with physical therapy in regards to TMJ exercises as well.    Headache Calendar  Please maintain a headache calendar  Consider using phone applications such as Migraine Ulises or Migraine Diary    Headache/migraine treatment:     Rescue medications (for immediate treatment of a headache):   It is ok to take ibuprofen, acetaminophen or naproxen (Advil, Tylenol,  Aleve, Excedrin) if they help your headaches you should limit these to No more than 3 times a week to avoid medication overuse/rebound headaches.     Over the counter preventive supplements for headaches/migraines (if you try, try for 3 months straight)  (to take every day to help prevent headaches - not to take at the time of headache):  There are combo pills online of these - none of which regulated by FDA and double check dosing - take appropriate dose only once a day- prevent a migraine, migravent, mind ease, migrelief   [x] Magnesium 400mg daily (If any diarrhea or upset " stomach, decrease dose  as tolerated)  [x] Riboflavin (Vitamin B2) 400mg daily (may make your urine bright/neon yellow)  - All supplements can be purchased online    Lifestyle Recommendations:  [x] SLEEP - Maintain a regular sleep schedule: Adults need at least 7-8 hours of uninterrupted a night. Maintain good sleep hygiene:  Going to bed and waking up at consistent times, avoiding excessive daytime naps, avoiding caffeinated beverages in the evening, avoid excessive stimulation in the evening and generally using bed primarily for sleeping.  One hour before bedtime would recommend turning lights down lower, decreasing your activity (may read quietly, listen to music at a low volume). When you get into bed, should eliminate all technology (no texting, emailing, playing with your phone, iPad or tablet in bed).  [x] HYDRATION - Maintain good hydration.  Drink  2L of fluid a day (4 typical small water bottles)  [x] DIET - Maintain good nutrition. In particular don't skip meals and try and eat healthy balanced meals regularly.  [x] TRIGGERS - Look for other triggers and avoid them: Limit caffeine to 1-2 cups a day or less. Avoid dietary triggers that you have noticed bring on your headaches (this could include aged cheese, peanuts, MSG, aspartame and nitrates).  [x] EXERCISE - physical exercise as we all know is good for you in many ways, and not only is good for your heart, but also is beneficial for your mental health, cognitive health and  chronic pain/headaches. I would encourage at the least 5 days of physical exercise weekly for at least 30 minutes.     Education and Follow-up  [x] Please call with any questions or concerns. Of course if any new concerning symptoms go to the emergency department.  [x] Follow up in 6 months with Rashaad COLÓN

## 2025-04-07 NOTE — PROGRESS NOTES
Name: Silvina Jay      : 1955      MRN: [id number]  Encounter Provider: Chato Mascorro PA-C  Encounter Date: 2025   Encounter department: [facility]  :  Assessment & Plan  Migraine with aura and without status migrainosus, not intractable  I had the pleasure of seeing Silvina today in the office at [facility] in [place].  She is presenting today for an initial new patient consultation in regard to migraine with aura.  At the time of this appointment, patient states her ENT provider had referred her over to neurology for further evaluation regard to ocular migraines.  The patient stated that she had 2 instances over the last few months where she had visual disturbances that were short and only lasting 15 to 30 minutes at a time.  There was no headache associated with these when occurring.  She does not have any past history of ocular migraines.  She notes that there was 1 instance a year ago when this occurred.  Seems likely that these are ocular migraines or migraine with aura.  Patient actually noted that she had headaches in her 40s to 50s where she had never had them before in the past and felt as though they were sinus related.  She did get nauseous and dizzy along with these headaches, not light sensitive or sound sensitive.  Advised patient that these could have likely been migraines and she may have a further migraine history than what she is aware of.  Regardless, advised patient that if these episodes start to occur more frequently that we would certainly look into MRI brain if need be.  Advised patient that if headaches seem to occur on a consistent basis or if the patient is having any other neurologic deficits that we would also consider imaging as well.  Advised patient to follow-up with her ENT provider in regards to sinus congestion and nonallergic rhinitis.  Advised to follow-up with physical therapy in regards to TMJ  "exercises as well.  No preventative medications or abortive medications indicated at this time due to the fact that the patient is not having any significant pain with the episodes and the episodes are not occurring very frequently.  Advised the patient if she would like she could try magnesium and B2 supplementation on a daily basis.  Advised patient to follow-up in approximately 6 months time with Rashaad TATE      Orders:    Ambulatory referral to Neurology      Patient Instructions   - Reports that the patient suffering from ocular migraines or migraine with aura but not having any headache associated with them.  The patient notes that these episodes have occurred twice in the last 2 months, and she did have 1 other episode about a year prior.  She will usually see lights and visions of \"flags\" in her vision, this episode usually last anywhere between 15 to 30 minutes at a time.  Advised patient that if these episodes happen more frequently or she is getting any headaches associated with them or any other neurologic symptoms that she can return and we will look into additional imaging.  Due to the infrequency of these episodes occurring and her past history of headaches that seemed related to migraines, do not believe that there is any additional imaging required at this point.  - Would like for the patient to still continue to follow with her ENT provider in regards to issues in regards to sinus congestion and nonallergic rhinitis.  Also would recommend following up with physical therapy in regards to TMJ exercises as well.    Headache Calendar  Please maintain a headache calendar  Consider using phone applications such as Migraine Ulises or Migraine Diary    Headache/migraine treatment:     Rescue medications (for immediate treatment of a headache):   It is ok to take ibuprofen, acetaminophen or naproxen (Advil, Tylenol,  Aleve, Excedrin) if they help your headaches you should limit these to No more than 3 times " a week to avoid medication overuse/rebound headaches.     Over the counter preventive supplements for headaches/migraines (if you try, try for 3 months straight)  (to take every day to help prevent headaches - not to take at the time of headache):  There are combo pills online of these - none of which regulated by FDA and double check dosing - take appropriate dose only once a day- prevent a migraine, migravent, mind ease, migrelief   [x] Magnesium 400mg daily (If any diarrhea or upset stomach, decrease dose  as tolerated)  [x] Riboflavin (Vitamin B2) 400mg daily (may make your urine bright/neon yellow)  - All supplements can be purchased online    Lifestyle Recommendations:  [x] SLEEP - Maintain a regular sleep schedule: Adults need at least 7-8 hours of uninterrupted a night. Maintain good sleep hygiene:  Going to bed and waking up at consistent times, avoiding excessive daytime naps, avoiding caffeinated beverages in the evening, avoid excessive stimulation in the evening and generally using bed primarily for sleeping.  One hour before bedtime would recommend turning lights down lower, decreasing your activity (may read quietly, listen to music at a low volume). When you get into bed, should eliminate all technology (no texting, emailing, playing with your phone, iPad or tablet in bed).  [x] HYDRATION - Maintain good hydration.  Drink  2L of fluid a day (4 typical small water bottles)  [x] DIET - Maintain good nutrition. In particular don't skip meals and try and eat healthy balanced meals regularly.  [x] TRIGGERS - Look for other triggers and avoid them: Limit caffeine to 1-2 cups a day or less. Avoid dietary triggers that you have noticed bring on your headaches (this could include aged cheese, peanuts, MSG, aspartame and nitrates).  [x] EXERCISE - physical exercise as we all know is good for you in many ways, and not only is good for your heart, but also is beneficial for your mental health, cognitive health  "and  chronic pain/headaches. I would encourage at the least 5 days of physical exercise weekly for at least 30 minutes.     Education and Follow-up  [x] Please call with any questions or concerns. Of course if any new concerning symptoms go to the emergency department.  [x] Follow up in 6 months with Rashaad COLÓN      History of Present Illness   HPI     Current medical illnesses  or past medical history include SVT, essential hypertension, ASHLEY, polyarthritis, anxiety, pure hypercholesterolemia, inflammatory bowel disease      Interval History:    Reported she started having head pressure, congestion, and jaw pain on the right side starting in January 2024.  She noted that this had continued over the last few weeks to months.  The patient had 2 instances of what she refers to as ocular migraines in that time.  The patient states that she had short, 15 to 30-minute instances of seeing bright lights and seeing \"colorful flags\" in her vision before they faded away slowly.  The patient notes that this had happened 1 other instance last year.  She had never had any concerns for ocular migraines before.  She did have headaches when she was younger around the age of 40 to 50 years old.  She felt as though these were more sinus headaches as there was a significant amount of pressure at the frontal area of her head, also had pressure at the back of the head as well.  She noted bandlike pain around the entirety of her head when these occurred.  With her headaches when she was around 40 to 50 years old, she did note nauseousness and dizziness.  Did not have any photophobia or phonophobia associated with them. She had tried to take Tylenol for the ocular migraines when they occurred.  Not quite sure if this was helpful or not.    She ended up having evaluation by ENT.  Was recommended to follow-up with PT for TMJ.  She did follow with her dentist and evaluated to see if she needed to have her mouth adjusted.  The patient stated " that she could not have any adjustments of the mouth due to the fact that she has sleep apnea.  She would prefer to continue to use CPAP for ASHLEY rather than have any type of adjustment. She stated that dentist claimed this was likely also TMJ related.  She was referred to neurology for further workup in regards to ocular migraines.  She was evaluated by her eye doctor which she states there was no issue with her eyesight.    The patient reports a family history of epilepsy, noted to have epilepsy and her daughter and her sister.  Patient has never exhibited any seizure-like activity before in the past.  She does not have any past history of any significant ocular migraines in her family.  She does not have any concern for family history of cerebral aneurysm noted.  She does not smoke, and her blood pressure today was 138/64.  Does not normally have any concern of high blood pressure.      Review of Systems   Constitutional:  Negative for appetite change, fatigue and fever.   HENT: Negative.  Negative for hearing loss, tinnitus, trouble swallowing and voice change.    Eyes: Negative.  Negative for photophobia, pain and visual disturbance.   Respiratory: Negative.  Negative for shortness of breath.    Cardiovascular: Negative.  Negative for palpitations.   Gastrointestinal: Negative.  Negative for nausea and vomiting.   Endocrine: Negative.  Negative for cold intolerance.   Genitourinary: Negative.  Negative for dysuria, frequency and urgency.   Musculoskeletal:  Negative for back pain, gait problem, myalgias, neck pain and neck stiffness.   Skin: Negative.  Negative for rash.   Allergic/Immunologic: Negative.    Neurological:  Negative for dizziness, tremors, seizures, syncope, facial asymmetry, speech difficulty, weakness, light-headedness, numbness and headaches.   Hematological: Negative.  Does not bruise/bleed easily.   Psychiatric/Behavioral: Negative.  Negative for confusion, hallucinations and sleep  disturbance.    All other systems reviewed and are negative.   I have personally reviewed the MA's review of systems and made changes as necessary.    Medical History Reviewed by provider this encounter:  Tobacco  Allergies  Meds  Problems  Med Hx  Surg Hx  Fam Hx     .  Past Medical History   Past Medical History:   Diagnosis Date    Allergic rhinitis     Identified non allergic rhinitis several years ago with post nasal drip, sinus congestion, ear fullness    Disease of thyroid gland     Dr. Gordillo identified hypo- thyroid    Dizziness     Experience on and off for a long time. Most recent can occur when walking    Ear problems     Ear fullness, tinnitus on going    Fatigue     GERD (gastroesophageal reflux disease)     Hyperlipidemia     Hypertension     Hypothyroid     Migraine     Nasal congestion     ASHLEY on CPAP     Osteoarthritis     Tinnitus     Identified a lond time ago    TMJ dysfunction     Identified several years ago by Dr. Niño     Past Surgical History:   Procedure Laterality Date    COLONOSCOPY      EGD      HERNIA REPAIR      MOUTH SURGERY      crown placement    TOTAL ABDOMINAL HYSTERECTOMY W/ BILATERAL SALPINGOOPHORECTOMY       Family History   Problem Relation Age of Onset    Hypertension Mother     Hyperlipidemia Mother     Lymphoma Mother     Cancer Mother     Diabetes Father     Cancer Father     Heart failure Father     Diabetes Brother     Heart failure Brother     Hypertension Brother     Seizures Sister       reports that she has never smoked. She has never used smokeless tobacco. She reports current alcohol use. She reports that she does not use drugs.  Current Outpatient Medications   Medication Instructions    ALPRAZolam (Xanax) 0.25 mg tablet No dose, route, or frequency recorded.    amLODIPine (NORVASC) 10 mg, Daily    azelastine (ASTELIN) 0.1 % nasal spray 1 spray, Nasal, 2 times daily    Biotin 2.5 MG CAPS 1 tablet, Oral, Daily    calcium carbonate (Tums) 500 mg  chewable tablet Oral    cholecalciferol (VITAMIN D3) 1,000 Units, Daily    Diclofenac Sodium (VOLTAREN) 2 g, Topical, 4 times daily     mg    fluticasone (Flonase) 50 mcg/act nasal spray No dose, route, or frequency recorded.    hydrochlorothiazide (HYDRODIURIL) 25 mg tablet 1 tablet 3 times per week or as directed    Levothyroxine Sodium 25 MCG CAPS Take by mouth    loratadine (Claritin) 10 mg tablet Oral    losartan (COZAAR) 100 MG tablet Oral    Multiple Vitamins-Minerals (multivitamin with minerals) tablet Daily    olmesartan (BENICAR) 40 mg, Daily    omeprazole (PriLOSEC OTC) 20 MG tablet No dose, route, or frequency recorded.    omeprazole (PRILOSEC) 20 mg, Daily    pravastatin (PRAVACHOL) 10 mg, Oral, Every evening    pravastatin (PRAVACHOL) 20 mg, Oral, Daily    sertraline (ZOLOFT) 25 mg, Oral, Daily    sucralfate (CARAFATE) 1 g, 4 times daily     Allergies   Allergen Reactions    Penicillins Other (See Comments) and Rash     rash      Cortisone Other (See Comments)     hot, red face      Simvastatin Myalgia    Wound Dressing Adhesive Itching     Redness, pain, burning, peeling skin    Escitalopram Anxiety, Other (See Comments) and Palpitations      Current Outpatient Medications on File Prior to Visit   Medication Sig Dispense Refill    ALPRAZolam (Xanax) 0.25 mg tablet       amLODIPine (NORVASC) 10 mg tablet Take 10 mg by mouth daily      Biotin 2.5 MG CAPS Take 1 tablet by mouth daily      calcium carbonate (Tums) 500 mg chewable tablet Chew      cholecalciferol (VITAMIN D3) 1,000 units tablet Take 1,000 Units by mouth daily      Docusate Sodium (DSS) 250 MG CAPS Take 250 mg by mouth (Patient taking differently: Take 250 mg by mouth if needed)      fluticasone (Flonase) 50 mcg/act nasal spray       hydrochlorothiazide (HYDRODIURIL) 25 mg tablet 1 tablet 3 times per week or as directed      Levothyroxine Sodium 25 MCG CAPS Take by mouth      loratadine (Claritin) 10 mg tablet Take by mouth       Multiple Vitamins-Minerals (multivitamin with minerals) tablet Take by mouth daily      olmesartan (BENICAR) 40 mg tablet Take 40 mg by mouth daily      omeprazole (PriLOSEC) 20 mg delayed release capsule Take 20 mg by mouth daily      pravastatin (PRAVACHOL) 20 mg tablet Take 20 mg by mouth daily      sertraline (ZOLOFT) 25 mg tablet Take 25 mg by mouth daily      azelastine (ASTELIN) 0.1 % nasal spray 1 spray into each nostril 2 (two) times a day (Patient not taking: Reported on 6/1/2023) 30 mL 11    Diclofenac Sodium (VOLTAREN) 1 % Apply 2 g topically 4 (four) times a day 150 g 1    losartan (COZAAR) 100 MG tablet Take by mouth      omeprazole (PriLOSEC OTC) 20 MG tablet  (Patient not taking: Reported on 4/7/2025)      pravastatin (PRAVACHOL) 10 mg tablet Take 10 mg by mouth every evening      sucralfate (CARAFATE) 1 g tablet Take 1 g by mouth 4 (four) times a day (Patient not taking: Reported on 6/1/2023)       No current facility-administered medications on file prior to visit.      Social History     Tobacco Use    Smoking status: Never    Smokeless tobacco: Never   Vaping Use    Vaping status: Never Used   Substance and Sexual Activity    Alcohol use: Yes    Drug use: Never    Sexual activity: Never        Objective   There were no vitals taken for this visit.    Physical Exam  Neurological Exam    Physical Exam:                                                                 Vitals:            Constitutional:    /64 (BP Location: Left arm, Patient Position: Sitting, Cuff Size: Adult)   Pulse (!) 52   Temp 97.8 °F (36.6 °C) (Temporal)   Wt 67.2 kg (148 lb 1.6 oz)   SpO2 100%   BMI 25.42 kg/m²   BP Readings from Last 3 Encounters:   04/07/25 138/64   03/13/25 120/80   06/01/23 149/77     Pulse Readings from Last 3 Encounters:   04/07/25 (!) 52   03/13/25 (!) 54   06/01/23 60         Well developed, well nourished, well groomed. No dysmorphic features.       Psychiatric:  Normal behavior and  appropriate affect        Neurological Examination:     Mental status/cognitive function:   Orientated to time, place and person. Recent and remote memory intact. Attention span and concentration as well as fund of knowledge are appropriate for age. Normal language and spontaneous speech.    Cranial Nerves:  II-visual fields full.   III, IV, VI-Pupils were equal, round, and reactive to light and accomodation. Extraocular movements were full and conjugate without nystagmus. Conjugate gaze, normal smooth pursuits, normal saccades   V-facial sensation symmetric.    VII-facial expression symmetric, intact forehead wrinkle, strong eye closure, symmetric smile    VIII-hearing grossly intact bilaterally   IX, X-palate elevation symmetric, no dysarthria.   XI-shoulder shrug strength intact    XII-tongue protrusion midline.    Motor Exam: symmetric bulk and tone throughout, no pronator drift. Power/strength 5/5 bilateral upper and lower extremities, no atrophy, fasciculations or abnormal movements noted.   Sensory: grossly intact light touch in all extremities.   Reflexes: brachioradialis 2+, biceps 2+, knee 2+ bilaterally  Coordination: Finger nose finger intact bilaterally, no apparent dysmetria, ataxia or tremor noted  Gait: steady casual and tandem gait.      Administrative Statements   I have spent a total time of 30 minutes in caring for this patient on the day of the visit/encounter including Risks and benefits of tx options, Instructions for management, Patient and family education, Importance of tx compliance, Risk factor reductions, Impressions, Counseling / Coordination of care, Documenting in the medical record, Reviewing/placing orders in the medical record (including tests, medications, and/or procedures), and Obtaining or reviewing history  .

## 2025-04-07 NOTE — ASSESSMENT & PLAN NOTE
I had the pleasure of seeing Silvina today in the office at [facility] in [place].  She is presenting today for an initial new patient consultation in regard to migraine with aura.  At the time of this appointment, patient states her ENT provider had referred her over to neurology for further evaluation regard to ocular migraines.  The patient stated that she had 2 instances over the last few months where she had visual disturbances that were short and only lasting 15 to 30 minutes at a time.  There was no headache associated with these when occurring.  She does not have any past history of ocular migraines.  She notes that there was 1 instance a year ago when this occurred.  Seems likely that these are ocular migraines or migraine with aura.  Patient actually noted that she had headaches in her 40s to 50s where she had never had them before in the past and felt as though they were sinus related.  She did get nauseous and dizzy along with these headaches, not light sensitive or sound sensitive.  Advised patient that these could have likely been migraines and she may have a further migraine history than what she is aware of.  Regardless, advised patient that if these episodes start to occur more frequently that we would certainly look into MRI brain if need be.  Advised patient that if headaches seem to occur on a consistent basis or if the patient is having any other neurologic deficits that we would also consider imaging as well.  Advised patient to follow-up with her ENT provider in regards to sinus congestion and nonallergic rhinitis.  Advised to follow-up with physical therapy in regards to TMJ exercises as well.  No preventative medications or abortive medications indicated at this time due to the fact that the patient is not having any significant pain with the episodes and the episodes are not occurring very frequently.  Advised the patient if she would like she could try magnesium and  B2 supplementation on a daily basis.  Advised patient to follow-up in approximately 6 months time with Rashaad TATE      Orders:    Ambulatory referral to Neurology

## 2025-04-24 ENCOUNTER — EVALUATION (OUTPATIENT)
Dept: PHYSICAL THERAPY | Facility: MEDICAL CENTER | Age: 70
End: 2025-04-24
Payer: MEDICARE

## 2025-04-24 DIAGNOSIS — R51.9 CRANIOFACIAL PAIN: ICD-10-CM

## 2025-04-24 DIAGNOSIS — G51.8 CRANIOFACIAL PAIN SYNDROME: Primary | ICD-10-CM

## 2025-04-24 DIAGNOSIS — G50.1 ATYPICAL FACIAL PAIN: ICD-10-CM

## 2025-04-24 DIAGNOSIS — M26.623 BILATERAL TEMPOROMANDIBULAR JOINT PAIN: ICD-10-CM

## 2025-04-24 PROCEDURE — 97163 PT EVAL HIGH COMPLEX 45 MIN: CPT | Performed by: PHYSICAL THERAPIST

## 2025-04-24 PROCEDURE — 97112 NEUROMUSCULAR REEDUCATION: CPT | Performed by: PHYSICAL THERAPIST

## 2025-04-24 NOTE — HOME EXERCISE EDUCATION
Program_ID:[id number]   Access Code: [email or web address]  URL: [email or web address]  Date: 04-  Prepared By: Jared Lezama    Program Notes      Exercises      - TMJ relaxed position -  x daily -  x weekly -  sets -  reps      - Progressive Relaxation - contract/relax -  x daily -  x weekly -  sets -  reps      - Seated Cervical Retraction -  x daily -  x weekly -  sets -  reps    Patient Education      - Workstation Ergonomics

## 2025-04-24 NOTE — HOME EXERCISE EDUCATION
Program_ID:[id number]   Access Code: [email or web address]  URL: [email or web address]  Date: 04-  Prepared By: Jared Lezama    Program Notes      Exercises      - TMJ relaxed position -  x daily -  x weekly -  sets -  reps      - Progressive Relaxation - contract/relax -  x daily -  x weekly -  sets -  reps      - Seated Cervical Retraction -  x daily -  x weekly -  sets -  reps    Patient Education      - Workstation Ergonomics

## 2025-04-24 NOTE — PROGRESS NOTES
PT Evaluation     Today's date: 2025  Patient name: Silvina Jay  : 1955  MRN: [id number]  Referring provider: El Wade DO  Dx:   Encounter Diagnosis     ICD-10-CM    1. 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 Craniofacial pain  R51.9       3. Atypical facial pain  G50.1       4. Bilateral temporomandibular joint pain  M26.623 Ambulatory referral to Physical Therapy                     Assessment    Assessment details: Problem List:  1) cervical hypomobility - addressing with mobs and mobility exercises   2) TMJ hypertonicity - addressing with neuromotor retraining   3) poor TMJ movement coordination - addressing with neuromotor retraining   4) habitual daytime bruxism - addressing with neuromotor retraining     Silvina Jay is a pleasant 70 y.o. female who presents with chronic craniofacial pain.  She has cervical hypomobility and poor TMJ movement coordination resulting in hypertonicity and cervicocraniofacial pain.  No further referral appears necessary at this time based upon examination results.  I expect she will be able to resolve the pain with eating and moving her neck with 8 visits over the next 3 months.        Comparable signs:  1) chewing tougher foods  2) looking up  3) cervical rotation    Goals  Patient will be independent with home exercise program.   Patient will be able to manage symptoms independently.   Patient will be able to eat tougher foods without limitation due to pain.   Patient will be able to look up without limitation due to pain.     Subjective Evaluation    History of Present Illness  Mechanism of injury: Zeinab reports she has had craniofacial pain for the past few years but it worsened considerably 3-4 months ago insidiously.  She has not had any changes in medication or activity during or just prior to that time.  She wears a CPAP at night and has no craniofacial pain in the morning.  She has most pain after eating - especially when eating  tougher foods. She also had auricular pain and neck pain that is associated with the craniofacial pain.  ENT and neurology consults ruled out those pathologies and she was referred to me for consult.  Quality of life: good    Patient Goals  Patient goals for therapy: decreased pain, increased motion and independence with ADLs/IADLs    Pain  At worst pain ratin      Objective     Static Posture     Head  Forward.    Shoulders  Rounded.    Postural Observations  Seated posture: poor  Standing posture: fair      Palpation   Left   Hypertonic in the scalenes, sternocleidomastoid, upper trapezius, masseter and medial pterygoid.   Tenderness of the scalenes, sternocleidomastoid, upper trapezius, masseter and medial pterygoid.     Right   Hypertonic in the scalenes, sternocleidomastoid, upper trapezius, masseter and medial pterygoid.   Tenderness of the scalenes, sternocleidomastoid, upper trapezius, masseter and medial pterygoid.     Neurological Testing     Sensation   Cervical/Thoracic   Left   Intact: light touch    Right   Intact: light touch    Reflexes   Left   Biceps (C5/C6): normal (2+)  Merida's reflex: negative    Right   Biceps (C5/C6): normal (2+)  Merida's reflex: negative    Active Range of Motion   Cervical/Thoracic Spine       Cervical    Flexion:  WFL  Extension:  Restriction level: maximal  Left lateral flexion:  Restriction level: maximal  Right lateral flexion:  Restriction level moderate  Left rotation:  Restriction level: moderate  Right rotation:  Restriction level: moderate  Left Shoulder   Normal active range of motion    Right Shoulder   Normal active range of motion    Left Elbow   Normal active range of motion    Right Elbow   Normal active range of motion    Left Wrist   Normal active range of motion    Right Wrist   Normal active range of motion    Joint Play     Hypomobile: C1, C2, C3, C4, C5, C6, C7, T1 and T2     Strength/Myotome Testing   Cervical Spine     Left   Normal  strength    Right   Normal strength    Tests   Cervical   Positive craniocervical flexion test.  Negative cervical distraction, alar ligament test, transverse ligament test and VBI.     Left   Positive cervical flexion-rotation test .   Negative Spurling's Test A.     Right   Positive cervical flexion-rotation test.   Negative Spurling's Test A.     Ambulation   Weight-Bearing Status   Weight-Bearing Status (Left): full weight bearing   Weight-Bearing Status (Right): full weight-bearing      Observational Gait   Gait: within functional limits   TMJ   Jaw observations: facial symmetry within normal limits  Occlusion class: class I (normal)  Patient does not have a  cleft palate  Scalloping of tongue: yes  Cusp wear: yes  Jaw trauma: no  Mursicatio buccarum: yes  Lateral bite test, Left: no pain  Lateral bite test, right: no pain  ROM: pain with movement  Opening (mm): 47   Lateral excursion, left (mm): 10  Lateral excursion, right (mm)t: 5   ROM comments: Crepitus R>L  Poor lingual resting position  Good lingual dissociation    Increased R lateral excursion to 7mm s/p cervical retractions           Precautions: none    Date: 4/24      Manual                                          Neuromuscular Re-education       TMJ deprogramming SK      Progressive contract-relax SK      Cervical retraction SK      TMJ controlled opening  []     Lingual elevation  []                          Therapeutic Exercise                                   Therapeutic Activities                     Gait Training                     Modalities

## 2025-04-24 NOTE — HOME EXERCISE EDUCATION
Program_ID:[id number]   Access Code: [email or web address]  URL: [email or web address]  Date: 04-  Prepared By: Jared Lezama    Program Notes      Exercises      - TMJ relaxed position -  x daily -  x weekly -  sets -  reps      - Progressive Relaxation - contract/relax -  x daily -  x weekly -  sets -  reps      - Seated Cervical Retraction -  x daily -  x weekly -  sets -  reps    Patient Education      - Workstation Ergonomics

## 2025-05-08 ENCOUNTER — OFFICE VISIT (OUTPATIENT)
Dept: PHYSICAL THERAPY | Facility: MEDICAL CENTER | Age: 70
End: 2025-05-08
Attending: OTOLARYNGOLOGY
Payer: MEDICARE

## 2025-05-08 DIAGNOSIS — M26.623 BILATERAL TEMPOROMANDIBULAR JOINT PAIN: ICD-10-CM

## 2025-05-08 DIAGNOSIS — R51.9 CRANIOFACIAL PAIN: ICD-10-CM

## 2025-05-08 DIAGNOSIS — G51.8 CRANIOFACIAL PAIN SYNDROME: Primary | ICD-10-CM

## 2025-05-08 DIAGNOSIS — G50.1 ATYPICAL FACIAL PAIN: ICD-10-CM

## 2025-05-08 PROCEDURE — 97112 NEUROMUSCULAR REEDUCATION: CPT | Performed by: PHYSICAL THERAPIST

## 2025-05-08 NOTE — PROGRESS NOTES
Daily Note     Today's date: 2025  Patient name: Silvina Jay  : 1955  MRN: [id number]  Referring provider: El Wade DO  Dx:   Encounter Diagnosis     ICD-10-CM    1. 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 Craniofacial pain  R51.9       3. Atypical facial pain  G50.1       4. Bilateral temporomandibular joint pain  M26.623                      Assessment:   Problem List:  1) cervical hypomobility - addressing with mobs and mobility exercises   2) TMJ hypertonicity - addressing with neuromotor retraining   3) poor TMJ movement coordination - addressing with neuromotor retraining   4) habitual daytime bruxism - addressing with neuromotor retraining     Comparable signs:  1) chewing tougher foods - no massive change  2) looking up - improving  3) cervical rotation - improving  4) working on her computer - improving     Goals  Patient will be independent with home exercise program. - in progress  Patient will be able to manage symptoms independently.  - in progress  Patient will be able to eat tougher foods without limitation due to pain.  - in progress  Patient will be able to look up without limitation due to pain.  - in progress      Plan: Reassess in 4 weeks.      Subjective: Zeinab reports she has been noticing less tightness over the past week.  However, she cracked #31 on  and is scheduled for a crown fitting on .  She reports she is doing home program multiple times a day but not hourly.  She notes most tightness after working on her computer for a long time.      Objective: See treatment diary below  Opening (mm): 45   Lateral excursion, left (mm): 10  Lateral excursion, right (mm): 9   ROM comments: Crepitus R>L  Poor lingual resting position  Good lingual dissociation    Precautions: none    Date:       Manual                                          Neuromuscular Re-education       TMJ deprogramming SK      Progressive contract-relax SK      Cervical retraction  SK      TMJ controlled opening SK      Lingual elevation  []     TMJ lateral deviation SK                    Therapeutic Exercise                                   Therapeutic Activities                     Gait Training                     Modalities

## 2025-05-08 NOTE — HOME EXERCISE EDUCATION
Program_ID:[id number]   Access Code: [email or web address]  URL: [email or web address]  Date: 05-  Prepared By: Jared Lezama    Program Notes      Exercises      - TMJ relaxed position -  x daily -  x weekly -  sets -  reps      - Controlled opening -  x daily -  x weekly -  sets -  reps      - Controlled TMJ Deviation -  x daily -  x weekly -  sets -  reps      - Progressive Relaxation - contract/relax -  x daily -  x weekly -  sets -  reps      - Seated Cervical Retraction -  x daily -  x weekly -  sets -  reps    Patient Education      - Workstation Ergonomics

## 2025-06-09 ENCOUNTER — OFFICE VISIT (OUTPATIENT)
Dept: PHYSICAL THERAPY | Facility: MEDICAL CENTER | Age: 70
End: 2025-06-09
Attending: OTOLARYNGOLOGY
Payer: MEDICARE

## 2025-06-09 DIAGNOSIS — R51.9 CRANIOFACIAL PAIN: ICD-10-CM

## 2025-06-09 DIAGNOSIS — M26.603 BILATERAL TEMPOROMANDIBULAR JOINT DISORDER: ICD-10-CM

## 2025-06-09 DIAGNOSIS — G51.8 CRANIOFACIAL PAIN SYNDROME: Primary | ICD-10-CM

## 2025-06-09 DIAGNOSIS — G50.1 ATYPICAL FACIAL PAIN: ICD-10-CM

## 2025-06-09 PROCEDURE — 97112 NEUROMUSCULAR REEDUCATION: CPT | Performed by: PHYSICAL THERAPIST

## 2025-06-09 PROCEDURE — 97140 MANUAL THERAPY 1/> REGIONS: CPT | Performed by: PHYSICAL THERAPIST

## 2025-06-09 NOTE — PROGRESS NOTES
Daily Note     Today's date: 2025  Patient name: Silvina Jay  : 1955  MRN: [id number]  Referring provider: El Wade DO  Dx:   Encounter Diagnosis     ICD-10-CM    1. 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 Craniofacial pain  R51.9       3. Atypical facial pain  G50.1       4. Bilateral temporomandibular joint disorder  M26.603                      Assessment:   Problem List:  1) cervical hypomobility - addressing with mobs and mobility exercises   2) TMJ hypertonicity - addressing with neuromotor retraining   3) poor TMJ movement coordination - addressing with neuromotor retraining   4) habitual daytime bruxism - addressing with neuromotor retraining     Comparable signs:  1) chewing tougher foods - improving  2) looking up - improving  3) cervical rotation - improving  4) working on her computer - improving     Goals  Patient will be independent with home exercise program. - in progress  Patient will be able to manage symptoms independently.  - in progress  Patient will be able to eat tougher foods without limitation due to pain.  - in progress  Patient will be able to look up without limitation due to pain.  - in progress      Plan: Reassess in 3 weeks.      Subjective: Zeinab reports she has been having less pain overall, except since the fixing of her crown.  Since then she has had increased pain on the right.        Objective: See treatment diary below  Opening (mm): 45   Lateral excursion, left (mm): 10  Lateral excursion, right (mm): 7 (10 by end of session)   ROM comments: Crepitus R>L  Poor lingual resting position  Good lingual dissociation    Precautions: none    Date:       Manual       SOR SK      C1-2 SNAG SK                           Neuromuscular Re-education       TMJ deprogramming SK      Progressive contract-relax SK      Cervical retraction SK      TMJ controlled opening SK      Lingual elevation       TMJ lateral deviation SK      Supine capital flexion SK                            Therapeutic Exercise                                   Therapeutic Activities                     Gait Training                     Modalities

## 2025-06-30 ENCOUNTER — OFFICE VISIT (OUTPATIENT)
Dept: PHYSICAL THERAPY | Facility: MEDICAL CENTER | Age: 70
End: 2025-06-30
Attending: OTOLARYNGOLOGY
Payer: MEDICARE

## 2025-06-30 DIAGNOSIS — G50.1 ATYPICAL FACIAL PAIN: ICD-10-CM

## 2025-06-30 DIAGNOSIS — G51.8 CRANIOFACIAL PAIN SYNDROME: Primary | ICD-10-CM

## 2025-06-30 DIAGNOSIS — R51.9 CRANIOFACIAL PAIN: ICD-10-CM

## 2025-06-30 DIAGNOSIS — M26.603 BILATERAL TEMPOROMANDIBULAR JOINT DISORDER: ICD-10-CM

## 2025-06-30 PROCEDURE — 97140 MANUAL THERAPY 1/> REGIONS: CPT | Performed by: PHYSICAL THERAPIST

## 2025-06-30 PROCEDURE — 97112 NEUROMUSCULAR REEDUCATION: CPT | Performed by: PHYSICAL THERAPIST

## 2025-06-30 NOTE — PROGRESS NOTES
Daily Note     Today's date: 2025  Patient name: Silvina Jay  : 1955  MRN: [id number]  Referring provider: El Wade DO  Dx:   Encounter Diagnosis     ICD-10-CM    1. 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 Craniofacial pain  R51.9       3. Atypical facial pain  G50.1       4. Bilateral temporomandibular joint disorder  M26.603                      Assessment:   Problem List:  1) cervical hypomobility - addressing with mobs and mobility exercises   2) TMJ hypertonicity - addressing with neuromotor retraining   3) poor TMJ movement coordination - addressing with neuromotor retraining   4) habitual daytime bruxism - addressing with neuromotor retraining     Comparable signs:  1) chewing tougher foods - improving  2) looking up - improving  3) cervical rotation - improving  4) working on her computer - improving     Goals  Patient will be independent with home exercise program. - in progress  Patient will be able to manage symptoms independently.  - in progress  Patient will be able to eat tougher foods without limitation due to pain.  - in progress  Patient will be able to look up without limitation due to pain.  - in progress      Plan: Reassess in 4 weeks.      Subjective: Zeinab reports she has been really good since last visit until the past 3 days, when she awoke with significant tightness in her neck.  She had to take Tylenol to stop it from becoming a headache 2 days in a row and today is better.          Objective: See treatment diary below  Opening (mm): 45   Lateral excursion, left (mm): 10  Lateral excursion, right (mm): 10  ROM comments: Crepitus R>L  Poor lingual resting position  Good lingual dissociation  Cervical retraction repeatedly decreased pain with R cervical rotation    Precautions: none    Date:       Manual       SOR/MFR SK      C1-2 SNAG SK                           Neuromuscular Re-education       TMJ deprogramming SK      Progressive contract-relax SK       Cervical retraction SK      TMJ controlled opening SK      Lingual elevation       TMJ lateral deviation SK      Supine capital flexion SK                           Therapeutic Exercise                                   Therapeutic Activities                     Gait Training                     Modalities

## 2025-07-28 ENCOUNTER — OFFICE VISIT (OUTPATIENT)
Dept: PHYSICAL THERAPY | Facility: MEDICAL CENTER | Age: 70
End: 2025-07-28
Attending: OTOLARYNGOLOGY
Payer: MEDICARE

## 2025-07-28 DIAGNOSIS — G50.1 ATYPICAL FACIAL PAIN: ICD-10-CM

## 2025-07-28 DIAGNOSIS — R51.9 CRANIOFACIAL PAIN: ICD-10-CM

## 2025-07-28 DIAGNOSIS — G51.8 CRANIOFACIAL PAIN SYNDROME: Primary | ICD-10-CM

## 2025-07-28 DIAGNOSIS — M26.603 BILATERAL TEMPOROMANDIBULAR JOINT DISORDER: ICD-10-CM

## 2025-07-28 PROCEDURE — 97164 PT RE-EVAL EST PLAN CARE: CPT | Performed by: PHYSICAL THERAPIST

## 2025-07-28 PROCEDURE — 97112 NEUROMUSCULAR REEDUCATION: CPT | Performed by: PHYSICAL THERAPIST
